# Patient Record
Sex: FEMALE | Race: WHITE | NOT HISPANIC OR LATINO | Employment: OTHER | URBAN - METROPOLITAN AREA
[De-identification: names, ages, dates, MRNs, and addresses within clinical notes are randomized per-mention and may not be internally consistent; named-entity substitution may affect disease eponyms.]

---

## 2017-08-29 ENCOUNTER — HOSPITAL ENCOUNTER (OUTPATIENT)
Dept: GENERAL RADIOLOGY | Facility: HOSPITAL | Age: 67
Discharge: HOME | End: 2017-08-29
Admitting: ORTHOPAEDIC SURGERY

## 2017-08-29 ENCOUNTER — APPOINTMENT (OUTPATIENT)
Dept: PREADMISSION TESTING | Facility: HOSPITAL | Age: 67
End: 2017-08-29

## 2017-08-29 VITALS — BODY MASS INDEX: 28.14 KG/M2 | WEIGHT: 149.03 LBS | HEIGHT: 61 IN

## 2017-08-29 DIAGNOSIS — Z01.89 LABORATORY TEST: Primary | ICD-10-CM

## 2017-08-29 LAB
ANION GAP SERPL CALCULATED.3IONS-SCNC: 4 MMOL/L (ref 3–11)
BASOPHILS # BLD AUTO: 0.06 10*3/MM3 (ref 0–0.2)
BASOPHILS NFR BLD AUTO: 0.6 % (ref 0–1)
BUN BLD-MCNC: 9 MG/DL (ref 9–23)
BUN/CREAT SERPL: 11.3 (ref 7–25)
CALCIUM SPEC-SCNC: 9.9 MG/DL (ref 8.7–10.4)
CHLORIDE SERPL-SCNC: 106 MMOL/L (ref 99–109)
CO2 SERPL-SCNC: 31 MMOL/L (ref 20–31)
CREAT BLD-MCNC: 0.8 MG/DL (ref 0.6–1.3)
DEPRECATED RDW RBC AUTO: 46.6 FL (ref 37–54)
EOSINOPHIL # BLD AUTO: 0.14 10*3/MM3 (ref 0–0.3)
EOSINOPHIL NFR BLD AUTO: 1.5 % (ref 0–3)
ERYTHROCYTE [DISTWIDTH] IN BLOOD BY AUTOMATED COUNT: 13.9 % (ref 11.3–14.5)
GFR SERPL CREATININE-BSD FRML MDRD: 72 ML/MIN/1.73
GLUCOSE BLD-MCNC: 127 MG/DL (ref 70–100)
HBA1C MFR BLD: 5.7 % (ref 4.8–5.6)
HCT VFR BLD AUTO: 43.5 % (ref 34.5–44)
HGB BLD-MCNC: 14.5 G/DL (ref 11.5–15.5)
IMM GRANULOCYTES # BLD: 0.02 10*3/MM3 (ref 0–0.03)
IMM GRANULOCYTES NFR BLD: 0.2 % (ref 0–0.6)
LYMPHOCYTES # BLD AUTO: 1.78 10*3/MM3 (ref 0.6–4.8)
LYMPHOCYTES NFR BLD AUTO: 19.3 % (ref 24–44)
MCH RBC QN AUTO: 30.5 PG (ref 27–31)
MCHC RBC AUTO-ENTMCNC: 33.3 G/DL (ref 32–36)
MCV RBC AUTO: 91.4 FL (ref 80–99)
MONOCYTES # BLD AUTO: 0.48 10*3/MM3 (ref 0–1)
MONOCYTES NFR BLD AUTO: 5.2 % (ref 0–12)
NEUTROPHILS # BLD AUTO: 6.76 10*3/MM3 (ref 1.5–8.3)
NEUTROPHILS NFR BLD AUTO: 73.2 % (ref 41–71)
PLATELET # BLD AUTO: 273 10*3/MM3 (ref 150–450)
PMV BLD AUTO: 11.2 FL (ref 6–12)
POTASSIUM BLD-SCNC: 4.2 MMOL/L (ref 3.5–5.5)
RBC # BLD AUTO: 4.76 10*6/MM3 (ref 3.89–5.14)
SODIUM BLD-SCNC: 141 MMOL/L (ref 132–146)
WBC NRBC COR # BLD: 9.24 10*3/MM3 (ref 3.5–10.8)

## 2017-08-29 PROCEDURE — 71020 HC CHEST PA AND LATERAL: CPT

## 2017-08-29 PROCEDURE — 85025 COMPLETE CBC W/AUTO DIFF WBC: CPT | Performed by: ORTHOPAEDIC SURGERY

## 2017-08-29 PROCEDURE — 86901 BLOOD TYPING SEROLOGIC RH(D): CPT

## 2017-08-29 PROCEDURE — 36415 COLL VENOUS BLD VENIPUNCTURE: CPT

## 2017-08-29 PROCEDURE — 93010 ELECTROCARDIOGRAM REPORT: CPT | Performed by: INTERNAL MEDICINE

## 2017-08-29 PROCEDURE — 93005 ELECTROCARDIOGRAM TRACING: CPT

## 2017-08-29 PROCEDURE — 80048 BASIC METABOLIC PNL TOTAL CA: CPT | Performed by: ORTHOPAEDIC SURGERY

## 2017-08-29 PROCEDURE — 86900 BLOOD TYPING SEROLOGIC ABO: CPT

## 2017-08-29 PROCEDURE — 83036 HEMOGLOBIN GLYCOSYLATED A1C: CPT | Performed by: ORTHOPAEDIC SURGERY

## 2017-08-29 RX ORDER — CALCIUM CARBONATE 750 MG/1
750 TABLET, CHEWABLE ORAL AS NEEDED
COMMUNITY

## 2017-08-29 RX ORDER — NAPROXEN SODIUM 220 MG
220 TABLET ORAL 2 TIMES DAILY PRN
COMMUNITY
End: 2017-09-09 | Stop reason: HOSPADM

## 2017-08-29 RX ORDER — HYDROCODONE BITARTRATE AND ACETAMINOPHEN 10; 325 MG/1; MG/1
1 TABLET ORAL EVERY 6 HOURS PRN
COMMUNITY
End: 2017-09-09 | Stop reason: HOSPADM

## 2017-08-30 LAB
ABO GROUP BLD: NORMAL
RH BLD: POSITIVE

## 2017-09-07 ENCOUNTER — APPOINTMENT (OUTPATIENT)
Dept: GENERAL RADIOLOGY | Facility: HOSPITAL | Age: 67
End: 2017-09-07

## 2017-09-07 ENCOUNTER — HOSPITAL ENCOUNTER (INPATIENT)
Facility: HOSPITAL | Age: 67
LOS: 2 days | Discharge: HOME HEALTH | End: 2017-09-09
Attending: ORTHOPAEDIC SURGERY | Admitting: ORTHOPAEDIC SURGERY

## 2017-09-07 ENCOUNTER — ANESTHESIA (OUTPATIENT)
Dept: PERIOP | Facility: HOSPITAL | Age: 67
End: 2017-09-07

## 2017-09-07 ENCOUNTER — ANESTHESIA EVENT (OUTPATIENT)
Dept: PERIOP | Facility: HOSPITAL | Age: 67
End: 2017-09-07

## 2017-09-07 DIAGNOSIS — Z96.652 STATUS POST TOTAL LEFT KNEE REPLACEMENT: ICD-10-CM

## 2017-09-07 DIAGNOSIS — Z78.9 IMPAIRED MOBILITY AND ADLS: ICD-10-CM

## 2017-09-07 DIAGNOSIS — Z74.09 IMPAIRED MOBILITY AND ADLS: ICD-10-CM

## 2017-09-07 DIAGNOSIS — Z74.09 IMPAIRED FUNCTIONAL MOBILITY, BALANCE, GAIT, AND ENDURANCE: Primary | ICD-10-CM

## 2017-09-07 PROBLEM — M17.12 OSTEOARTHRITIS OF LEFT KNEE: Status: ACTIVE | Noted: 2017-09-07

## 2017-09-07 PROBLEM — K21.9 GERD (GASTROESOPHAGEAL REFLUX DISEASE): Status: ACTIVE | Noted: 2017-09-07

## 2017-09-07 PROBLEM — R73.03 PREDIABETES: Status: ACTIVE | Noted: 2017-09-07

## 2017-09-07 LAB
GLUCOSE BLDC GLUCOMTR-MCNC: 106 MG/DL (ref 70–130)
GLUCOSE BLDC GLUCOMTR-MCNC: 71 MG/DL (ref 70–130)
GLUCOSE BLDC GLUCOMTR-MCNC: 81 MG/DL (ref 70–130)

## 2017-09-07 PROCEDURE — 82962 GLUCOSE BLOOD TEST: CPT

## 2017-09-07 PROCEDURE — 25010000002 ROPIVACAINE PER 1 MG: Performed by: NURSE ANESTHETIST, CERTIFIED REGISTERED

## 2017-09-07 PROCEDURE — 25010000002 MORPHINE PER 10 MG: Performed by: ORTHOPAEDIC SURGERY

## 2017-09-07 PROCEDURE — 97162 PT EVAL MOD COMPLEX 30 MIN: CPT

## 2017-09-07 PROCEDURE — C1776 JOINT DEVICE (IMPLANTABLE): HCPCS | Performed by: ORTHOPAEDIC SURGERY

## 2017-09-07 PROCEDURE — 97116 GAIT TRAINING THERAPY: CPT

## 2017-09-07 PROCEDURE — C1713 ANCHOR/SCREW BN/BN,TIS/BN: HCPCS | Performed by: ORTHOPAEDIC SURGERY

## 2017-09-07 PROCEDURE — C1755 CATHETER, INTRASPINAL: HCPCS | Performed by: ORTHOPAEDIC SURGERY

## 2017-09-07 PROCEDURE — 25010000002 PHENYLEPHRINE PER 1 ML: Performed by: NURSE ANESTHETIST, CERTIFIED REGISTERED

## 2017-09-07 PROCEDURE — 25010000002 PROPOFOL 10 MG/ML EMULSION: Performed by: NURSE ANESTHETIST, CERTIFIED REGISTERED

## 2017-09-07 PROCEDURE — 25010000002 HYDROMORPHONE PER 4 MG: Performed by: ORTHOPAEDIC SURGERY

## 2017-09-07 PROCEDURE — G0108 DIAB MANAGE TRN  PER INDIV: HCPCS

## 2017-09-07 PROCEDURE — 94799 UNLISTED PULMONARY SVC/PX: CPT

## 2017-09-07 PROCEDURE — 25010000002 ROPIVACAINE PER 1 MG: Performed by: ORTHOPAEDIC SURGERY

## 2017-09-07 PROCEDURE — 73560 X-RAY EXAM OF KNEE 1 OR 2: CPT

## 2017-09-07 PROCEDURE — 25010000002 ONDANSETRON PER 1 MG: Performed by: ORTHOPAEDIC SURGERY

## 2017-09-07 PROCEDURE — 0SRD0J9 REPLACEMENT OF LEFT KNEE JOINT WITH SYNTHETIC SUBSTITUTE, CEMENTED, OPEN APPROACH: ICD-10-PCS | Performed by: ORTHOPAEDIC SURGERY

## 2017-09-07 DEVICE — ATTUNE PATELLA MEDIALIZED ANATOMIC 32MM CEMENTED AOX
Type: IMPLANTABLE DEVICE | Site: KNEE | Status: FUNCTIONAL
Brand: ATTUNE

## 2017-09-07 DEVICE — TOTL KN ATTUNE DEPUY 9527038: Type: IMPLANTABLE DEVICE | Site: KNEE | Status: FUNCTIONAL

## 2017-09-07 DEVICE — ATTUNE KNEE SYSTEM FEMORAL POSTERIOR STABILIZED NARROW SIZE 5N LEFT CEMENTED
Type: IMPLANTABLE DEVICE | Site: KNEE | Status: FUNCTIONAL
Brand: ATTUNE

## 2017-09-07 DEVICE — ATTUNE KNEE SYSTEM TIBIAL INSERT FIXED BEARING POSTERIOR STABILIZED 5 6MM AOX
Type: IMPLANTABLE DEVICE | Site: KNEE | Status: FUNCTIONAL
Brand: ATTUNE

## 2017-09-07 DEVICE — SMARTSET HIGH PERFORMANCE MV MEDIUM VISCOSITY BONE CEMENT 40G
Type: IMPLANTABLE DEVICE | Status: FUNCTIONAL
Brand: SMARTSET

## 2017-09-07 DEVICE — ATTUNE KNEE SYSTEM TIBIAL BASE FIXED BEARING SIZE 3 CEMENTED
Type: IMPLANTABLE DEVICE | Site: KNEE | Status: FUNCTIONAL
Brand: ATTUNE

## 2017-09-07 RX ORDER — DOCUSATE SODIUM 100 MG/1
100 CAPSULE, LIQUID FILLED ORAL 2 TIMES DAILY PRN
Status: DISCONTINUED | OUTPATIENT
Start: 2017-09-07 | End: 2017-09-09 | Stop reason: HOSPADM

## 2017-09-07 RX ORDER — SODIUM CHLORIDE 0.9 % (FLUSH) 0.9 %
1-10 SYRINGE (ML) INJECTION AS NEEDED
Status: DISCONTINUED | OUTPATIENT
Start: 2017-09-07 | End: 2017-09-07 | Stop reason: HOSPADM

## 2017-09-07 RX ORDER — HYDRALAZINE HYDROCHLORIDE 20 MG/ML
10 INJECTION INTRAMUSCULAR; INTRAVENOUS EVERY 6 HOURS PRN
Status: DISCONTINUED | OUTPATIENT
Start: 2017-09-07 | End: 2017-09-09 | Stop reason: HOSPADM

## 2017-09-07 RX ORDER — CLINDAMYCIN PHOSPHATE 900 MG/50ML
900 INJECTION INTRAVENOUS EVERY 8 HOURS
Status: COMPLETED | OUTPATIENT
Start: 2017-09-07 | End: 2017-09-08

## 2017-09-07 RX ORDER — ASPIRIN 325 MG
325 TABLET, DELAYED RELEASE (ENTERIC COATED) ORAL DAILY
Status: DISCONTINUED | OUTPATIENT
Start: 2017-09-08 | End: 2017-09-09 | Stop reason: HOSPADM

## 2017-09-07 RX ORDER — NICOTINE POLACRILEX 4 MG
15 LOZENGE BUCCAL
Status: DISCONTINUED | OUTPATIENT
Start: 2017-09-07 | End: 2017-09-09 | Stop reason: HOSPADM

## 2017-09-07 RX ORDER — MORPHINE SULFATE 1 MG/ML
INJECTION, SOLUTION EPIDURAL; INTRATHECAL; INTRAVENOUS AS NEEDED
Status: DISCONTINUED | OUTPATIENT
Start: 2017-09-07 | End: 2017-09-07 | Stop reason: HOSPADM

## 2017-09-07 RX ORDER — SENNA AND DOCUSATE SODIUM 50; 8.6 MG/1; MG/1
2 TABLET, FILM COATED ORAL 2 TIMES DAILY
Status: DISCONTINUED | OUTPATIENT
Start: 2017-09-07 | End: 2017-09-09 | Stop reason: HOSPADM

## 2017-09-07 RX ORDER — FAMOTIDINE 20 MG/1
20 TABLET, FILM COATED ORAL ONCE
Status: COMPLETED | OUTPATIENT
Start: 2017-09-07 | End: 2017-09-07

## 2017-09-07 RX ORDER — DEXTROSE MONOHYDRATE 25 G/50ML
25 INJECTION, SOLUTION INTRAVENOUS
Status: DISCONTINUED | OUTPATIENT
Start: 2017-09-07 | End: 2017-09-09 | Stop reason: HOSPADM

## 2017-09-07 RX ORDER — ROPIVACAINE HYDROCHLORIDE 2 MG/ML
12 INJECTION, SOLUTION EPIDURAL; INFILTRATION CONTINUOUS
Status: DISCONTINUED | OUTPATIENT
Start: 2017-09-07 | End: 2017-09-09 | Stop reason: HOSPADM

## 2017-09-07 RX ORDER — ONDANSETRON 2 MG/ML
4 INJECTION INTRAMUSCULAR; INTRAVENOUS EVERY 6 HOURS PRN
Status: DISCONTINUED | OUTPATIENT
Start: 2017-09-07 | End: 2017-09-09 | Stop reason: HOSPADM

## 2017-09-07 RX ORDER — HYDROMORPHONE HYDROCHLORIDE 1 MG/ML
0.5 INJECTION, SOLUTION INTRAMUSCULAR; INTRAVENOUS; SUBCUTANEOUS
Status: CANCELLED | OUTPATIENT
Start: 2017-09-07

## 2017-09-07 RX ORDER — SODIUM CHLORIDE, SODIUM LACTATE, POTASSIUM CHLORIDE, CALCIUM CHLORIDE 600; 310; 30; 20 MG/100ML; MG/100ML; MG/100ML; MG/100ML
9 INJECTION, SOLUTION INTRAVENOUS CONTINUOUS
Status: DISCONTINUED | OUTPATIENT
Start: 2017-09-07 | End: 2017-09-09 | Stop reason: HOSPADM

## 2017-09-07 RX ORDER — CLINDAMYCIN PHOSPHATE 900 MG/50ML
900 INJECTION, SOLUTION INTRAVENOUS ONCE
Status: COMPLETED | OUTPATIENT
Start: 2017-09-07 | End: 2017-09-07

## 2017-09-07 RX ORDER — FAMOTIDINE 10 MG/ML
20 INJECTION, SOLUTION INTRAVENOUS ONCE
Status: CANCELLED | OUTPATIENT
Start: 2017-09-07 | End: 2017-09-07

## 2017-09-07 RX ORDER — HYDROMORPHONE HYDROCHLORIDE 1 MG/ML
0.5 INJECTION, SOLUTION INTRAMUSCULAR; INTRAVENOUS; SUBCUTANEOUS
Status: DISCONTINUED | OUTPATIENT
Start: 2017-09-07 | End: 2017-09-09 | Stop reason: HOSPADM

## 2017-09-07 RX ORDER — ROPIVACAINE HYDROCHLORIDE 5 MG/ML
INJECTION, SOLUTION EPIDURAL; INFILTRATION; PERINEURAL AS NEEDED
Status: DISCONTINUED | OUTPATIENT
Start: 2017-09-07 | End: 2017-09-07 | Stop reason: HOSPADM

## 2017-09-07 RX ORDER — BUPIVACAINE HYDROCHLORIDE 2.5 MG/ML
INJECTION, SOLUTION EPIDURAL; INFILTRATION; INTRACAUDAL AS NEEDED
Status: DISCONTINUED | OUTPATIENT
Start: 2017-09-07 | End: 2017-09-07 | Stop reason: SURG

## 2017-09-07 RX ORDER — ACETAMINOPHEN 325 MG/1
650 TABLET ORAL EVERY 4 HOURS PRN
Status: DISCONTINUED | OUTPATIENT
Start: 2017-09-07 | End: 2017-09-09 | Stop reason: HOSPADM

## 2017-09-07 RX ORDER — BISACODYL 5 MG/1
10 TABLET, DELAYED RELEASE ORAL DAILY PRN
Status: DISCONTINUED | OUTPATIENT
Start: 2017-09-07 | End: 2017-09-09 | Stop reason: HOSPADM

## 2017-09-07 RX ORDER — HYDROCODONE BITARTRATE AND ACETAMINOPHEN 7.5; 325 MG/1; MG/1
2 TABLET ORAL EVERY 4 HOURS PRN
Status: DISCONTINUED | OUTPATIENT
Start: 2017-09-07 | End: 2017-09-09 | Stop reason: HOSPADM

## 2017-09-07 RX ORDER — DIPHENHYDRAMINE HCL 25 MG
25 CAPSULE ORAL EVERY 6 HOURS PRN
Status: DISCONTINUED | OUTPATIENT
Start: 2017-09-07 | End: 2017-09-09 | Stop reason: HOSPADM

## 2017-09-07 RX ORDER — CLINDAMYCIN PHOSPHATE 900 MG/50ML
900 INJECTION, SOLUTION INTRAVENOUS EVERY 8 HOURS
Status: DISCONTINUED | OUTPATIENT
Start: 2017-09-07 | End: 2017-09-07 | Stop reason: SDUPTHER

## 2017-09-07 RX ORDER — HYDROCODONE BITARTRATE AND ACETAMINOPHEN 7.5; 325 MG/1; MG/1
1 TABLET ORAL EVERY 4 HOURS PRN
Status: DISCONTINUED | OUTPATIENT
Start: 2017-09-07 | End: 2017-09-09 | Stop reason: HOSPADM

## 2017-09-07 RX ORDER — MELOXICAM 7.5 MG/1
15 TABLET ORAL DAILY
Status: DISCONTINUED | OUTPATIENT
Start: 2017-09-07 | End: 2017-09-09 | Stop reason: HOSPADM

## 2017-09-07 RX ORDER — MAGNESIUM HYDROXIDE 1200 MG/15ML
LIQUID ORAL AS NEEDED
Status: DISCONTINUED | OUTPATIENT
Start: 2017-09-07 | End: 2017-09-07 | Stop reason: HOSPADM

## 2017-09-07 RX ORDER — SODIUM CHLORIDE 9 MG/ML
100 INJECTION, SOLUTION INTRAVENOUS CONTINUOUS
Status: DISCONTINUED | OUTPATIENT
Start: 2017-09-07 | End: 2017-09-09 | Stop reason: HOSPADM

## 2017-09-07 RX ORDER — CALCIUM CARBONATE 200(500)MG
1.5 TABLET,CHEWABLE ORAL AS NEEDED
Status: DISCONTINUED | OUTPATIENT
Start: 2017-09-07 | End: 2017-09-09 | Stop reason: HOSPADM

## 2017-09-07 RX ORDER — PROPOFOL 10 MG/ML
VIAL (ML) INTRAVENOUS CONTINUOUS PRN
Status: DISCONTINUED | OUTPATIENT
Start: 2017-09-07 | End: 2017-09-07 | Stop reason: SURG

## 2017-09-07 RX ORDER — BISACODYL 10 MG
10 SUPPOSITORY, RECTAL RECTAL DAILY PRN
Status: DISCONTINUED | OUTPATIENT
Start: 2017-09-07 | End: 2017-09-09 | Stop reason: HOSPADM

## 2017-09-07 RX ORDER — ONDANSETRON 4 MG/1
4 TABLET, FILM COATED ORAL EVERY 6 HOURS PRN
Status: DISCONTINUED | OUTPATIENT
Start: 2017-09-07 | End: 2017-09-09 | Stop reason: HOSPADM

## 2017-09-07 RX ORDER — LIDOCAINE HYDROCHLORIDE 10 MG/ML
0.5 INJECTION, SOLUTION EPIDURAL; INFILTRATION; INTRACAUDAL; PERINEURAL ONCE AS NEEDED
Status: COMPLETED | OUTPATIENT
Start: 2017-09-07 | End: 2017-09-07

## 2017-09-07 RX ORDER — FENTANYL CITRATE 50 UG/ML
50 INJECTION, SOLUTION INTRAMUSCULAR; INTRAVENOUS
Status: DISCONTINUED | OUTPATIENT
Start: 2017-09-07 | End: 2017-09-07 | Stop reason: HOSPADM

## 2017-09-07 RX ORDER — NALOXONE HCL 0.4 MG/ML
0.1 VIAL (ML) INJECTION
Status: DISCONTINUED | OUTPATIENT
Start: 2017-09-07 | End: 2017-09-09 | Stop reason: HOSPADM

## 2017-09-07 RX ORDER — BUPIVACAINE HYDROCHLORIDE 5 MG/ML
INJECTION, SOLUTION EPIDURAL; INTRACAUDAL AS NEEDED
Status: DISCONTINUED | OUTPATIENT
Start: 2017-09-07 | End: 2017-09-07 | Stop reason: SURG

## 2017-09-07 RX ORDER — DIPHENHYDRAMINE HYDROCHLORIDE 50 MG/ML
25 INJECTION INTRAMUSCULAR; INTRAVENOUS EVERY 6 HOURS PRN
Status: DISCONTINUED | OUTPATIENT
Start: 2017-09-07 | End: 2017-09-09 | Stop reason: HOSPADM

## 2017-09-07 RX ADMIN — Medication 2 TABLET: at 18:13

## 2017-09-07 RX ADMIN — HYDROCODONE BITARTRATE AND ACETAMINOPHEN 1 TABLET: 7.5; 325 TABLET ORAL at 15:02

## 2017-09-07 RX ADMIN — PHENYLEPHRINE HYDROCHLORIDE 100 MCG: 10 INJECTION INTRAVENOUS at 11:24

## 2017-09-07 RX ADMIN — PROPOFOL 50 MCG/KG/MIN: 10 INJECTION, EMULSION INTRAVENOUS at 10:20

## 2017-09-07 RX ADMIN — HYDROMORPHONE HYDROCHLORIDE 0.5 MG: 1 INJECTION, SOLUTION INTRAMUSCULAR; INTRAVENOUS; SUBCUTANEOUS at 16:47

## 2017-09-07 RX ADMIN — CLINDAMYCIN PHOSPHATE 900 MG: 900 INJECTION, SOLUTION INTRAVENOUS at 10:12

## 2017-09-07 RX ADMIN — FAMOTIDINE 20 MG: 20 TABLET ORAL at 08:20

## 2017-09-07 RX ADMIN — BUPIVACAINE HYDROCHLORIDE 10 MG: 5 INJECTION, SOLUTION EPIDURAL; INTRACAUDAL; PERINEURAL at 10:17

## 2017-09-07 RX ADMIN — SODIUM CHLORIDE, POTASSIUM CHLORIDE, SODIUM LACTATE AND CALCIUM CHLORIDE 9 ML/HR: 600; 310; 30; 20 INJECTION, SOLUTION INTRAVENOUS at 08:10

## 2017-09-07 RX ADMIN — HYDROCODONE BITARTRATE AND ACETAMINOPHEN 1 TABLET: 7.5; 325 TABLET ORAL at 19:12

## 2017-09-07 RX ADMIN — PHENYLEPHRINE HYDROCHLORIDE 100 MCG: 10 INJECTION INTRAVENOUS at 11:12

## 2017-09-07 RX ADMIN — ONDANSETRON 4 MG: 2 INJECTION INTRAMUSCULAR; INTRAVENOUS at 16:09

## 2017-09-07 RX ADMIN — SODIUM CHLORIDE 680 MG: 9 INJECTION, SOLUTION INTRAVENOUS at 11:18

## 2017-09-07 RX ADMIN — TRANEXAMIC ACID 680 MG: 100 INJECTION, SOLUTION INTRAVENOUS at 10:26

## 2017-09-07 RX ADMIN — MELOXICAM 15 MG: 7.5 TABLET ORAL at 15:02

## 2017-09-07 RX ADMIN — LIDOCAINE HYDROCHLORIDE 0.2 ML: 10 INJECTION, SOLUTION EPIDURAL; INFILTRATION; INTRACAUDAL; PERINEURAL at 08:10

## 2017-09-07 RX ADMIN — PROPOFOL 50 MCG/KG/MIN: 10 INJECTION, EMULSION INTRAVENOUS at 10:17

## 2017-09-07 RX ADMIN — ROPIVACAINE HYDROCHLORIDE 12 ML/HR: 2 INJECTION, SOLUTION EPIDURAL; INFILTRATION at 12:30

## 2017-09-07 RX ADMIN — CLINDAMYCIN PHOSPHATE 900 MG: 18 INJECTION, SOLUTION INTRAVENOUS at 18:20

## 2017-09-07 RX ADMIN — BUPIVACAINE HYDROCHLORIDE 20 ML: 2.5 INJECTION, SOLUTION EPIDURAL; INFILTRATION; INTRACAUDAL; PERINEURAL at 11:53

## 2017-09-07 RX ADMIN — SODIUM CHLORIDE 100 ML/HR: 9 INJECTION, SOLUTION INTRAVENOUS at 13:48

## 2017-09-07 NOTE — H&P
Patient Name: Qi Gupta  MRN: [id number]  : 1950  DOS: 2017    Attending: Jacques Younger, *    Primary Care Provider: Marylu Kimbrough MD      Chief complaint:  Left knee pain    Subjective   Patient is a 66 y.o. female presented for left total knee arthroplasty by Dr. Younger under spinal anesthesia. She tolerated surgery well and is admitted for further medical management. Her knee has been painful for several years. She uses a can occasionally when the pain is severe.     When seen in PACU she is doing well. She denies pain, but is still under effects of spinal anesthesia. She denies nausea, shortness of breath or chest pain. No hx of DVT or PE.    ( When seen later in her room, she continues to do well.  Effects of spinal anesthesia have subsided.  She has some mild pain and she has received Lortab.  No nausea or vomiting, no fever or chills, no shortness of breath or chest pain.  She is awaiting ambulating with PT.)wy     Allergies:  Allergies   Allergen Reactions   • Ceftin [Cefuroxime Axetil] Hives and Itching       Meds:  Prescriptions Prior to Admission   Medication Sig Dispense Refill Last Dose   • calcium carbonate EX (TUMS EX) 750 MG chewable tablet Chew 750 mg As Needed for Indigestion or Heartburn.   Past Week at Unknown time   • naproxen sodium (ALEVE) 220 MG tablet Take 220 mg by mouth 2 (Two) Times a Day As Needed for Mild Pain .   Past Month at Unknown time   • psyllium (METAMUCIL) 58.6 % packet Take 1 packet by mouth Daily.   2017 at 0830   • HYDROcodone-acetaminophen (NORCO)  MG per tablet Take 1 tablet by mouth Every 6 (Six) Hours As Needed for Moderate Pain .   2017       History:   Past Medical History:   Diagnosis Date   • Acid reflux    • Arthritis    • Diverticulosis    • Hypertension     Borderline   • Knee pain, left    • Mitral valve prolapse    • Wears glasses      Past Surgical History:   Procedure Laterality Date   • BIOPSY SKIN / SQ / MUCOUS  MEMBRANE      MOLE- UPPER RIGHT LEG   • COLONOSCOPY     • LAPAROSCOPIC TUBAL LIGATION       History reviewed. No pertinent family history.  Social History   Substance Use Topics   • Smoking status: Former Smoker     Packs/day: 0.25     Years: 30.00     Types: Cigarettes     Quit date: 8/29/1999   • Smokeless tobacco: Never Used   • Alcohol use No   She is  with 2 children. She is retired from factory.    Review of Systems  Pertinent items are noted in HPI, all other systems reviewed and negative    Vital Signs  /79 (BP Location: Right arm)  Pulse 85  Temp 97.6 °F (36.4 °C) (Oral)   Resp 16  SpO2 98%    Physical Exam:    General Appearance:    Alert, cooperative, in no acute distress   Head:    Normocephalic, without obvious abnormality, atraumatic   Eyes:            Lids and lashes normal, conjunctivae and sclerae normal, no   icterus, no pallor, corneas clear, PERRLA   Ears:    Ears appear intact with no abnormalities noted   Neck:   No adenopathy, supple, trachea midline, no thyromegaly, no     carotid bruit, no JVD   Lungs:     Clear to auscultation,respirations regular, even and                   unlabored    Heart:    Regular rhythm and normal rate, normal S1 and S2, no            murmur, no gallop    Abdomen:     Normal bowel sounds, no masses, no organomegaly, soft        non-tender, non-distended, no guarding, no rebound                 tenderness   Genitalia:    Deferred   Extremities:   Left knee ACE wrap CDI. Nerve block present   Pulses:   Pulses palpable and equal bilaterally   Skin:   No bleeding, bruising or rash   Neurologic:   Cranial nerves 2 - 12 grossly intact, sensation intact and movement limited due to spinal block effects  Exam later: Flexion and dorsiflexion intact bilateral feet.  Normal sensation distal LEs.wy        I reviewed the patient's new clinical results.     Results for MARANDA JIMENEZ (MRN [id number]) as of 9/7/2017 12:23   Ref. Range 8/29/2017 12:55   Glucose  Latest Ref Range: 70 - 100 mg/dL 127 (H)   Sodium Latest Ref Range: 132 - 146 mmol/L 141   Potassium Latest Ref Range: 3.5 - 5.5 mmol/L 4.2   CO2 Latest Ref Range: 20.0 - 31.0 mmol/L 31.0   Chloride Latest Ref Range: 99 - 109 mmol/L 106   Anion Gap Latest Ref Range: 3.0 - 11.0 mmol/L 4.0   Creatinine Latest Ref Range: 0.60 - 1.30 mg/dL 0.80   BUN Latest Ref Range: 9 - 23 mg/dL 9   BUN/Creatinine Ratio Latest Ref Range: 7.0 - 25.0  11.3   Calcium Latest Ref Range: 8.7 - 10.4 mg/dL 9.9   eGFR Non African Amer Latest Ref Range: >60 mL/min/1.73 72   Hemoglobin A1C Latest Ref Range: 4.80 - 5.60 % 5.70 (H)   WBC Latest Ref Range: 3.50 - 10.80 10*3/mm3 9.24   RBC Latest Ref Range: 3.89 - 5.14 10*6/mm3 4.76   Hemoglobin Latest Ref Range: 11.5 - 15.5 g/dL 14.5   Hematocrit Latest Ref Range: 34.5 - 44.0 % 43.5   RDW Latest Ref Range: 11.3 - 14.5 % 13.9   MCV Latest Ref Range: 80.0 - 99.0 fL 91.4   MCH Latest Ref Range: 27.0 - 31.0 pg 30.5   MCHC Latest Ref Range: 32.0 - 36.0 g/dL 33.3   MPV Latest Ref Range: 6.0 - 12.0 fL 11.2   Platelets Latest Ref Range: 150 - 450 10*3/mm3 273     Assessment and Plan:   Principal Problem:    Status post total left knee replacement  Active Problems:    Osteoarthritis of left knee    Prediabetes    GERD (gastroesophageal reflux disease)      Plan  1. PT/OT- early ambulation post op  2. Pain control-prns, AC nerve block  3. IS-encourage  4. DVT proph- mechs/ASA  5. Bowel regimen  6. Resume home medications as appropriate  7. Monitor post-op labs, BG  8. DC planning for home with HHPT    GERD  - Continue home Tums  PreDM  - hgb A1c on 8/29/17 5.7  - Accuchecks ACHS with low dose SSI  - DM educator consult    Seen and examined by me. Agree with above, plan formulated with APRN . Discussed with patient and family.     Madiha Broussard MD  09/07/17  3:53 PM

## 2017-09-07 NOTE — PLAN OF CARE
Problem: Inpatient Physical Therapy  Goal: Stair Training Goal LTG- PT  Outcome: Ongoing (interventions implemented as appropriate)    09/07/17 1535   Stair Training PT LTG   Stair Training Goal PT LTG, Date Established 09/07/17   Stair Training Goal PT LTG, Time to Achieve 5 days   Stair Training Goal PT LTG, Number of Steps 1   Stair Training Goal PT LTG, [place] Level conditional independence   Stair Training Goal PT LTG, Assist Device walker, rolling;other (see comments)  (backwards)   Stair Training Goal PT LTG, Date Goal Reviewed 09/07/17   Stair Training Goal PT LTG, Outcome goal ongoing

## 2017-09-07 NOTE — ANESTHESIA PROCEDURE NOTES
Spinal Block    Patient location during procedure: OR  Start Time: 9/7/2017 10:13 AM  Stop Time: 9/7/2017 10:17 AM  Indication:at surgeon's request  Performed By  CRNA: WILLIE ESCOBEDO  Preanesthetic Checklist  Completed: patient identified, site marked, surgical consent, pre-op evaluation, timeout performed, IV checked, risks and benefits discussed and monitors and equipment checked  Spinal Block Prep:  Patient Position:sitting  Sterile Tech:cap, gloves, sterile barriers and mask  Prep:Chloraprep  Patient Monitoring:blood pressure monitoring, continuous pulse oximetry and EKG  Spinal Block Procedure  Approach:midline  Guidance:landmark technique and palpation technique  Location:L4-L5  Needle Type:Jacek  Needle Gauge:25 G  Placement of Spinal needle event:cerebrospinal fluid aspirated    Fluid Appearance:clear  Post Assessment  Patient Tolerance:patient tolerated the procedure well with no apparent complications  Complications no  Additional Notes  Procedure:  Pt assisted to sitting position, with legs in position of comfort over side of bed.  Pt. instructed in optimal spine presentation, the spine was prepped/ Draped and the skin at insertion site was anesthetized with 1% Lidocaine 2 ml.  The spinal needle was then advanced until CSF flow was obtained and LA was injected:      Marcaine 0.5% PSF injected 10   Mg.

## 2017-09-07 NOTE — ANESTHESIA PROCEDURE NOTES
Adductor Canal Cath    Patient location during procedure: post-op  Start time: 9/7/2017 11:45 AM  Stop time: 9/7/2017 11:53 AM  Reason for block: at surgeon's request and post-op pain management  Performed by  Anesthesiologist: COLTON RAINEY  Assisted by: WILLIE ESCOBEDO  Preanesthetic Checklist  Completed: patient identified, site marked, surgical consent, pre-op evaluation, timeout performed, IV checked, risks and benefits discussed and monitors and equipment checked  Prep:  Sterile barriers:cap, gloves, mask and sterile barriers  Prep: ChloraPrep  Patient monitoring: continuous pulse oximetry and EKG  Procedure  Sedation:no  Performed under: spinal  Guidance:ultrasound guided  Images:still images obtained    Laterality:left  Block Type:adductor canal block  Injection Technique:catheter  Needle Type:Tuohy and echogenic  Needle Gauge:18 G    Catheter Size:20 G (20g)  Cath Depth at skin: 9 cm  Medications  Local Injected:bupivacaine 0.25% Local Amount Injected:20 (ml)mL  Post Assessment  Injection Assessment: negative aspiration for heme, incremental injection and no paresthesia on injection  Patient Tolerance:comfortable throughout block  Complications:no  Additional Notes  Procedure:             The pt was placed in the Supine position.  The Insertion site was  prepped and Draped in sterile fashion.  The pt was anesthetized with  IV Sedation( see meds).  Skin and cutaneous tissue was infiltrated and anesthetized with 1% Lidocaine 3 mls via a 25g needle.  A BBraun 4 inch 18g echogenic needle was then  inserted approximately midline, mid-thigh and advanced In-plane with Ultrasound guidance.  Normal Saline PSF was utilized for hydrodissection of tissue.  The Vastus medialis and Sartorius muscle where visualized and the needle tip was placed in the adductor canal,  lateral to the femoral artery.  LA injection spread was visualized, injection was incremental 1-5ml, injection pressure was normal or little, no  intraneural injection, no vascular injection.  LA dose was injected thru the needle(see dose above).  A BBraun 20g wire stylet catheter was placed via the needle with ultrasound visualization and confirmation with NS fluid bolus. The labeled Catheter was then secured to skin at insertion site with skin afix and steristrips to curled catheter and CHG transparent dressing.  Thank you.

## 2017-09-07 NOTE — CONSULTS
Diabetes Education  Assessment/Teaching    Patient Name:  Qi Gupta  YOB: 1950  MRN: [id number]  Admit Date:  9/7/2017      Assessment Date:  9/7/2017       Most Recent Value    General Information      Referral From:  A1c, Blood glucose, MD order    Pregnancy Assessment     Diabetes History     What type of diabetes do you have?  Pre-diabetes    Length of Diabetes Diagnosis  Newly diagnosed <6 months    Current DM knowledge  poor    Have you had diabetes education/teaching in the past?  no    Do you test your blood sugar at home?  no    Education Preferences     What areas of diabetes would you like to learn about?  avoiding high blood sugar, diabetes complications, diet information, exercise information, understanding diabetes, testing my blood sugar at home, stress/coping skills, resources on diabetes    Nutrition Information     Assessment Topics     Healthy Eating - Assessment  Needs education    Being Active - Assessment  Needs education    Problem Solving - Assessment  Needs education    Reducing Risk - Assessment  Needs education    Healthy Coping - Assessment  Needs education    Monitoring - Assessment  Needs education    DM Goals     Healthy Eating - Goal  0-7 days from discharge    Being Active - Goal  0-30 days from discharge    Problem Solving - Goal  0-7 days from discharge    Reducing Risk - Goal  0-7 days from discharge    Healthy Coping - Goal  0-7 days from discharge    Monitoring - Goal  0-7 days from discharge               Most Recent Value    DM Education Needs     Meter  Meter provided    Meter Type  One Touch    Frequency of Testing  3 times a week    Blood Glucose Target Range  <100    Reducing Risks  A1C testing, Blood pressure, Eye exam, Immunizations, Cardiovascular    Healthy Eating  Reviewed meal plan    Physical Activity  Walking    Physical Activity Frequency  Rarely    Healthy Coping  Appropriate    Motivation  Engaged    Teaching Method  Explanation, Discussion,  Handouts, Teach back    Patient Response  Verbalized understanding            Other Comments:    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 Pt educated with family present in room. Patient educated on Pre-diabetes, diabetes and the disease process, types of DM, diagnosis/A1C, monitoring, signs and symptoms, activity and exercise and how to prevent disease from progressing. Changing behavior and goal setting relative to diet, exercise and healthy lifestyle was emphasized. Patient provided a new donated meter and knows how to use it. Pt. was also advised to contact PCP for prescription for lancets and strips. Pt. verbalized understanding and advised to consult with PCP for further instructions, discuss A1C result, and POC. Education handouts provided, questions answered and pt. advised to call with any other questions or concerns.        Electronically signed by:  Juvencio Villarreal RN  09/07/17 4:37 PM

## 2017-09-07 NOTE — BRIEF OP NOTE
TOTAL KNEE ARTHROPLASTY  Procedure Note    Qi Gupta  9/7/2017    Pre-op Diagnosis: Left knee osteoarthritis    Post-op Diagnosis:     Post-Op Diagnosis Codes:     * Osteoarthritis of left knee [M17.9]    Procedure/CPT® Codes:  [place] TOTAL KNEE ARTHROPLASTY [25894]    Procedure(s):  TOTAL KNEE ARTHROPLASTY LEFT     Surgeon(s):  Jacques Younger MD     Assistant: BHARAT Stevenson    Anesthesia: Spinal, local and adductor canal catheter    Staff:   Circulator: Halie Welch RN; Nico Mendez RN  Scrub Person: Evy Schmidt  Nursing Assistant: Tyler Bojorquez  Assistant: BHARAT Suh  [place]: Mihaela Knowles    Estimated Blood Loss: *No blood loss documented*  Urine Voided: * No values recorded between 9/7/2017 10:11 AM and 9/7/2017 11:59 AM *    Specimens:                * No specimens in log *      Drains: None          Findings: Severe tricompartmental left knee degenerative changes    Complications: None    Tourniquet time: 70 minutes at 300 mmHg    Implants: Depuy Attune PS TKA size 5N femur, 3 tibia, 6 poly and 32 patella      Jacques Younger MD     Date: 9/7/2017  Time: 12:14 PM

## 2017-09-07 NOTE — INTERVAL H&P NOTE
H&P reviewed. The patient was examined and there are no changes to the H&P.     Temp:  [98 °F (36.7 °C)] 98 °F (36.7 °C)  Heart Rate:  [62] 62  BP: (162)/(84) 162/84     Heart: RRR  Lungs: CTAB    Immunizations:    Tetanus: No     Influenza:No     Pneumo:No    Labs were reviewed.       Results for MARANDA JIMENEZ (MRN [id number]) as of 9/7/2017 08:51   Ref. Range 8/29/2017 12:55   Glucose Latest Ref Range: 70 - 100 mg/dL 127 (H)   Sodium Latest Ref Range: 132 - 146 mmol/L 141   Potassium Latest Ref Range: 3.5 - 5.5 mmol/L 4.2   CO2 Latest Ref Range: 20.0 - 31.0 mmol/L 31.0   Chloride Latest Ref Range: 99 - 109 mmol/L 106   Anion Gap Latest Ref Range: 3.0 - 11.0 mmol/L 4.0   Creatinine Latest Ref Range: 0.60 - 1.30 mg/dL 0.80   BUN Latest Ref Range: 9 - 23 mg/dL 9   BUN/Creatinine Ratio Latest Ref Range: 7.0 - 25.0  11.3   Calcium Latest Ref Range: 8.7 - 10.4 mg/dL 9.9   eGFR Non African Amer Latest Ref Range: >60 mL/min/1.73 72   Hemoglobin A1C Latest Ref Range: 4.80 - 5.60 % 5.70 (H)   WBC Latest Ref Range: 3.50 - 10.80 10*3/mm3 9.24   RBC Latest Ref Range: 3.89 - 5.14 10*6/mm3 4.76   Hemoglobin Latest Ref Range: 11.5 - 15.5 g/dL 14.5   Hematocrit Latest Ref Range: 34.5 - 44.0 % 43.5   RDW Latest Ref Range: 11.3 - 14.5 % 13.9   MCV Latest Ref Range: 80.0 - 99.0 fL 91.4   MCH Latest Ref Range: 27.0 - 31.0 pg 30.5   MCHC Latest Ref Range: 32.0 - 36.0 g/dL 33.3   MPV Latest Ref Range: 6.0 - 12.0 fL 11.2   Platelets Latest Ref Range: 150 - 450 10*3/mm3 273   RDW-SD Latest Ref Range: 37.0 - 54.0 fl 46.6   Neutrophil % Latest Ref Range: 41.0 - 71.0 % 73.2 (H)   Lymphocyte % Latest Ref Range: 24.0 - 44.0 % 19.3 (L)   Monocyte % Latest Ref Range: 0.0 - 12.0 % 5.2   Eosinophil % Latest Ref Range: 0.0 - 3.0 % 1.5   Basophil % Latest Ref Range: 0.0 - 1.0 % 0.6   Immature Grans % Latest Ref Range: 0.0 - 0.6 % 0.2   Neutrophils, Absolute Latest Ref Range: 1.50 - 8.30 10*3/mm3 6.76   Lymphocytes, Absolute Latest Ref Range: 0.60  - 4.80 10*3/mm3 1.78   Monocytes, Absolute Latest Ref Range: 0.00 - 1.00 10*3/mm3 0.48   Eosinophils, Absolute Latest Ref Range: 0.00 - 0.30 10*3/mm3 0.14   Basophils, Absolute Latest Ref Range: 0.00 - 0.20 10*3/mm3 0.06   Immature Grans, Absolute Latest Ref Range: 0.00 - 0.03 10*3/mm3 0.02     EKG: NSR    Plan: left TKA

## 2017-09-07 NOTE — ANESTHESIA PREPROCEDURE EVALUATION
Anesthesia Evaluation     Patient summary reviewed and Nursing notes reviewed   no history of anesthetic complications:  NPO Solid Status: > 8 hours  NPO Liquid Status: > 8 hours     Airway   Mallampati: II  TM distance: >3 FB  Neck ROM: full  no difficulty expected  Dental - normal exam     Pulmonary - normal exam   (+) a smoker Former,   Cardiovascular   Exercise tolerance: good (4-7 METS)    ECG reviewed  Rhythm: regular  Rate: normal    (+) hypertension, valvular problems/murmurs MVP,   (-) angina, NELSON      Neuro/Psych- negative ROS  GI/Hepatic/Renal/Endo    (+)  GERD well controlled,   (-) hepatitis, liver disease, no renal disease, diabetes    Musculoskeletal     (+) arthralgias,   Abdominal    Substance History      OB/GYN          Other   (+) arthritis                                     Anesthesia Plan    ASA 2     spinal, regional and MAC   (Labs/studies reviewed  Post op ACB)  intravenous induction   Anesthetic plan and risks discussed with patient.  Use of blood products discussed with consented to blood products.   Plan discussed with CRNA.

## 2017-09-07 NOTE — OP NOTE
Preoperative diagnosis: Left knee end stage osteoarthritis    Postoperative diagnosis: Left knee end stage osteoarthritis    Operative procedure: Left total knee arthroplasty    Surgeon: Dr. Isak Younger    Assistant: BHARAT Stevenson.  Necessary during the case for positioning of the patient, exposure, implantation of components and closure.    Anesthesia: Spinal with local and adductor canal catheter    Estimated blood loss: Minimal    Implants: Depuy Attune  posterior stabilized total knee arthroplasty size 5 narrow femur, 3 tibia, 32 patella, 5 poly    Tourniquet time: 70 minutes at 300 mmHg    Indications for procedure: Qi is a very pleasant 66-year-old female who has struggled with end-stage activity limiting right knee pain secondary to osteoarthritis.  X-rays this year revealed severe tricompartmental degenerative changes in a varus knee with complete loss of medial joint space and marginal osteophyte formation.  She has failed exhaustive conservative treatment measures including cortisone injections, viscosupplementation injections in June and physical therapy.  After undergoing a shared decision-making process they agreed to proceed with left total knee arthroplasty.  Surgical consent form was signed.    Description of procedure: The patient was seen in the preoperative holding area and the left leg was signed to confirm the correct operative site.  They were seen by anesthesia.  They received Clindamycin 900mg IV prophylactic antibiotics within 1 hour of incision time.  They were brought back to the operating room.  Spinal was performed without difficulty and was given sedation throughout the case.  Patient placed in the supine position and all of  bony prominences were well-padded.  A bump was placed underneath the left hip.  Nonsterile tourniquet was applied to the thigh.  The left lower extremity was prepped and draped in the usual sterile fashion and timeout was performed to confirm left total knee  arthroplasty for patient Qi Gupta.    The left lower extremity was exsanguinated with an Esmarch.  Tourniquet was inflated to 300 mmHg.  With the knee flexed a midline incision was made with a 10 blade scalpel.  Adequate hemostasis maintained with Bovie electrocautery.  Full-thickness medial and lateral flaps were elevated.  Using a fresh 10 blade scalpel I made a medial parapatellar arthrotomy.  The patella was everted.  Patella fat pad and anterior femoral fat pads were excised.  Marginal osteophytes were removed.  Medial release was performed for this varus knee using Bovie electrocautery.  Kaufman's line was marked.  Z retractors were placed medially and laterally.  The distal femur was then drilled and intramedullary distal femoral cutting guide was pinned in place set at a 5° valgus cut for a 9 mm cut.  This cut was then made with the oscillating saw.  The femur was then sized to a size 5 set at 3° of external rotation.  4-in-1 cutting guide was pinned in place.  Anterior and posterior cuts were made as were the chamfer cuts.  Cut bone was removed.  We then turned our attention to the tibia.    The tibia was subluxed anteriorly. PCL retractor was placed as were medial and lateral Hohmann retractors.  We then used the extramedullary tibial cutting guide set at 3° posterior slope for the proximal tibial cut.  5 mm of bone was removed from the low medial side and a centimeter from the high lateral side.  Medial and lateral menisci were then excised as were posterior osteophytes.  Flexion and extension gaps were then checked and with a 6 mm block we achieved full extension and flexion with excellent alignment. The tibia was sized to a 3 tibial tray centered off the medial third of the tibial tubercle.  The tray was pinned in place.  We then reamed the tibia and impacted the tibial fins which were left in place for trialing.  Size 5 cutting guide was then used to make the box femoral cut with the reciprocal  saw.  The trial femur was then placed and held in place with pins. We trialed with a size 6 poly, 5 femur and 3 tibia.  With these trial components in place we achieved full extension and flexion with excellent alignment and stable throughout.  Lug holes for the femur were drilled.    We then turned our attention to the patella.  Patella was sized to 22 mm in thickness so I made a 9-1/2 mm patellar cut with the reciprocal saw.  A 32 trial was placed and the patella drill holes were made.  With the trial button in place there was excellent tracking with the no thumbs technique.    Trial components were then removed.  Final components were opened on the back table.  Posterior capsule was injected with 20 cc of half percent Ropivicaine and 5 mg of morphine.  Cement was mixed on the back table.  The knee was copiously irrigated with Pulsavac lavage.  The knee was thoroughly dried and a bone plug was placed in the distal femoral drill hole.  Cement was then applied to the tibia and final size 3 tibial tray was impacted in place and excess cement was removed.  Cement was applied to the femur and final size 5 narrow femur was impacted in place and excess cement removed.  We then placed a 6 mm poly-this was seen to be fully seated in the tibial tray.  Knee was then placed in extension and we applied cement to the cut patellar surface and 32 patella was held in place with patellar clamp.  All excess cement was removed.  The knee was left in extension while cement cured.    Once the cement was fully cured we irrigated the knee with Pulsavac lavage.  The knee was taken through full range of motion and seen to achieve full extension and flexion with excellent patellar tracking.    We then proceeded with closure.  The deep fascia was closed with a #1 Stratafix barbed suture.  Dermis was closed with 2-0 Vicryl.  Skin was closed with a running 3-0 Stratafix barbed subcuticular suture.  A sterile dressing was then applied with  Pirineo mesh dressing and Dermabond.  We then applied 4 x 4's, ABDs, soft roll and a six-inch Ace bandage.    Tourniquet was deflated at 70 minutes.  Patient was transferred to recovery in stable condition.  All sponge and needle counts were correct ×2.  Patient received first dose of TXA just prior to incision and the second dose 5-10 minutes prior to letting down the tourniquet to minimize bleeding.    Postoperative plan:  Patient will be admitted to Dr. SAM for medical management  Mobilize starting today with PT/OT as tolerated  Start Aspirin for DVT prophylaxis tomorrow   consult for home physical therapy and home equipment needs  Follow-up with me in 2-3 weeks.    Jacques Younger MD  09/07/17  12:15 PM

## 2017-09-07 NOTE — ANESTHESIA POSTPROCEDURE EVALUATION
Patient: Qi Gupta    Procedure Summary     Date Anesthesia Start Anesthesia Stop Room / Location    09/07/17 1011 1158 [facility] / [facility]       Procedure Diagnosis Surgeon Provider    TOTAL KNEE ARTHROPLASTY LEFT  (Left Knee) No diagnosis on file. MD Harper Adkins MD          Anesthesia Type: spinal, regional, MAC  Last vitals  /68       Temp     98.0   Pulse    76   Resp     16   SpO2     95     Post Anesthesia Care and Evaluation    Patient location during evaluation: PACU  Patient participation: complete - patient participated  Level of consciousness: awake and alert  Pain score: 0  Pain management: adequate  Airway patency: patent  Anesthetic complications: No anesthetic complications  PONV Status: none  Cardiovascular status: hemodynamically stable and acceptable  Respiratory status: nonlabored ventilation, acceptable and nasal cannula  Hydration status: acceptable

## 2017-09-07 NOTE — THERAPY EVALUATION
Acute Care - Physical Therapy Initial Evaluation  [facility]     Patient Name: Qi Gupta  : 1950  MRN: [id number]  Today's Date: 2017   Onset of Illness/Injury or Date of Surgery Date: 17  Date of Referral to PT: 17  Referring Physician: MD Aren      Admit Date: 2017     Visit Dx:    ICD-10-CM ICD-9-CM   1. Impaired functional mobility, balance, gait, and endurance Z74.09 V49.89     Patient Active Problem List   Diagnosis   • Osteoarthritis of left knee   • Status post total left knee replacement   • Prediabetes   • GERD (gastroesophageal reflux disease)     Past Medical History:   Diagnosis Date   • Acid reflux    • Arthritis    • Diverticulosis    • Hypertension     Borderline   • Knee pain, left    • Mitral valve prolapse    • Wears glasses      Past Surgical History:   Procedure Laterality Date   • BIOPSY SKIN / SQ / MUCOUS MEMBRANE      MOLE- UPPER RIGHT LEG   • COLONOSCOPY     • LAPAROSCOPIC TUBAL LIGATION            PT ASSESSMENT (last 72 hours)      PT Evaluation       17 1535 17 0812    Rehab Evaluation    Document Type evaluation  -LR     Subjective Information agree to therapy;complains of;pain;numbness  -LR     Patient Effort, Rehab Treatment good  -LR     Symptoms Noted During/After Treatment increased pain;other (see comments)   nausea and vomiting.   -LR     Symptoms Noted Comment Notified RN.   -LR     General Information    Patient Profile Review yes  -LR     Onset of Illness/Injury or Date of Surgery Date 17  -LR     Referring Physician MD Aren  -LR     General Observations Patient supine in bed upon arrival with  and family present. IV, L adductor canal nerve catheter, L knee ace bandaged, SCDs.   -LR     Pertinent History Of Current Problem Patient presents for surgical management of persistent and progressive L knee pain and dysfunction that has failed to improve with conservative management.   -LR     Precautions/Limitations fall  precautions;other (see comments)   L adductor canal nerve catheter  -LR     Prior Level of Function min assist:;all household mobility;gait;transfer;bed mobility;dependent:;community mobility;home management;cooking;cleaning;shopping;driving   all mobility limited by pain;no driving,no home management.   -LR     Equipment Currently Used at Home cane, straight;cane, quad;grab bar;bath bench   used SPC occasionally  -LR cane, straight;other (see comments)  -NM    Plans/Goals Discussed With patient;spouse/S.O.;family;agreed upon  -LR     Risks Reviewed patient:;spouse/S.O.:;family:;LOB;nausea/vomiting;dizziness;increased discomfort  -LR     Benefits Reviewed patient:;spouse/S.O.:;family:;improve function;increase independence;increase strength;increase balance;decrease pain;increase knowledge  -LR     Barriers to Rehab previous functional deficit  -LR     Living Environment    Lives With spouse  -LR spouse  -NM    Living Arrangements house  -LR house  -NM    Home Accessibility bed and bath on same level;grab bars present (toilet);house is not wheelchair accessible;stairs to enter home;stairs within home;tub/shower is not walk in   will be staying on first floor  -LR     Number of Stairs to Enter Home --   2 steps onto porch, then one into home, no handrails  -LR     Number of Stairs Within Home 1   into sunroom  -LR     Stair Railings at Home none  -LR     Type of Financial/Environmental Concern none  -LR     Transportation Available family or friend will provide  -LR family or friend will provide  -NM    Living Environment Comment Patient reports her  will be available to assist at all times.   -LR     Clinical Impression    Date of Referral to PT 09/07/17  -LR     PT Diagnosis s/p elective L TKA  -LR     Prognosis good  -LR     Patient/Family Goals Statement go home, decrease pain  -LR     Criteria for Skilled Therapeutic Interventions Met yes;treatment indicated  -LR     Rehab Potential good, to achieve  stated therapy goals  -LR     Pain Assessment    Pain Assessment 0-10  -LR     Pain Score 7  -LR     Post Pain Score 10  -LR     Pain Type Acute pain  -LR     Pain Location Knee  -LR     Pain Orientation Left;Anterior;Outer  -LR     Pain Intervention(s) Repositioned;Ambulation/increased activity;Medication (See MAR)  -LR     Vision Assessment/Intervention    Visual Impairment WFL  -LR     Cognitive Assessment/Intervention    Current Cognitive/Communication Assessment functional  -LR     Orientation Status oriented x 4;required verbal cueing (specifiy in comments)  -LR     Follows Commands/Answers Questions 100% of the time;able to follow single-step instructions;needs cueing  -LR     Personal Safety WNL/WFL  -LR     ROM (Range of Motion)    General ROM lower extremity range of motion deficits identified  -LR     General LE Assessment    ROM RLE ROM was WFL;knee, left: LE ROM deficit  -LR     ROM Detail L knee AROM impaired 25%  -LR     MMT (Manual Muscle Testing)    General MMT Assessment lower extremity strength deficits identified  -LR     Lower Extremity    Lower Ext Manual Muscle Testing right LE strength is WFL;left knee strength deficit  -LR     Lower Ext Manual Muscle Testing Detail L knee functionally 4-/5  -LR     Mobility Assessment/Training    Extremity Weight-Bearing Status left lower extremity  -LR     Left Lower Extremity Weight-Bearing weight-bearing as tolerated  -LR     Bed Mobility, Assessment/Treatment    Bed Mobility, Assistive Device head of bed elevated;bed rails  -LR     Bed Mob, Supine to Sit, [place] verbal cues required;minimum assist (75% patient effort)  -LR     Bed Mob, Sit to Supine, [place] not tested   UI at end of eval.   -LR     Bed Mobility, Safety Issues decreased use of legs for bridging/pushing  -LR     Bed Mobility, Impairments ROM decreased;strength decreased;pain  -LR     Bed Mobility, Comment Verbal cues to move LEs towards EOB and to push up from bed to raise  trunk into sitting and to scoot hips out to get feet on floor. Min assist required to move L LE. Pt c/o nausea upon sitting up and proceeded to vomit in basin. Notified RN.   -LR     Transfer Assessment/Treatment    Transfers, Sit-Stand [place] verbal cues required;contact guard assist;2 person assist required  -LR     Transfers, Stand-Sit [place] verbal cues required;contact guard assist;2 person assist required  -LR     Transfers, Sit-Stand-Sit, Assist Device rolling walker  -LR     Transfer, Safety Issues sequencing ability decreased;step length decreased;weight-shifting ability decreased  -LR     Transfer, Impairments ROM decreased;strength decreased;pain  -LR     Transfer, Comment Verbal cues to push up from bed to stand and to reach back for chair to lower into sitting. Verbal cues to step L LE out before t/f.   -LR     Gait Assessment/Treatment    Gait, [place] Level verbal cues required;contact guard assist;2 person assist required  -LR     Gait, Assistive Device rolling walker  -LR     Gait, Distance (Feet) 150  -LR     Gait, Gait Pattern Analysis swing-through gait  -LR     Gait, Gait Deviations left:;antalgic;decreased heel strike;forward flexed posture;step length decreased;toe-to-floor clearance decreased;weight-shifting ability decreased  -LR     Gait, Safety Issues sequencing ability decreased;step length decreased;weight-shifting ability decreased  -LR     Gait, Impairments ROM decreased;strength decreased;pain  -LR     Gait, Comment Patient initiated gait with step to gait pattern at slow pace. Verbal cues for increased L LE weight bearing/stance phase and decreased UE weight bearing. Improved with cues for correction, able to progress to step through gait pattern. Gait limited by pain/fatigue.   -LR     Therapy Exercises    Exercise Protocols total knee  -LR     Total Knee Exercises left:;10 reps;ankle pumps/circles;quad set;glut set   cues for technique  -LR     Sensory  Assessment/Intervention    Sensory Impairment --   c/o mild numbness; able to actively DF bilaterally  -LR     Positioning and Restraints    Pre-Treatment Position in bed  -LR     Post Treatment Position chair  -LR     In Chair notified nsg;reclined;sitting;call light within reach;encouraged to call for assist;exit alarm on;legs elevated;compression device;with family/caregiver  -LR       User Key  (r) = Recorded By, (t) = Taken By, (c) = Cosigned By    Initials Name Provider Type    LR Yenny Lujan, PT Physical Therapist    BRITTNEY Fair RN Registered Nurse          Physical Therapy Education     Title: PT OT SLP Therapies (Done)     Topic: Physical Therapy (Done)     Point: Mobility training (Done)    Learning Progress Summary    Learner Readiness Method Response Comment Documented by Status   Patient Acceptance D,E VU,NR Educated on weight bearing status and correct gait mechanics.  09/07/17 1728 Done               Point: Home exercise program (Done)    Learning Progress Summary    Learner Readiness Method Response Comment Documented by Status   Patient Acceptance D,E VU,NR Educated on weight bearing status and correct gait mechanics.  09/07/17 1728 Done               Point: Body mechanics (Done)    Learning Progress Summary    Learner Readiness Method Response Comment Documented by Status   Patient Acceptance D,E VU,NR Educated on weight bearing status and correct gait mechanics. LR 09/07/17 1728 Done               Point: Precautions (Done)    Learning Progress Summary    Learner Readiness Method Response Comment Documented by Status   Patient Acceptance D,E VU,NR Educated on weight bearing status and correct gait mechanics.  09/07/17 1728 Done                      User Key     Initials Effective Dates Name Provider Type Discipline     06/19/15 -  Yenny Lujan, PT Physical Therapist PT                PT Recommendation and Plan  Anticipated Equipment Needs At Discharge: front wheeled  walker  Anticipated Discharge Disposition: home with assist, home with home health  Planned Therapy Interventions: balance training, bed mobility training, gait training, home exercise program, patient/family education, ROM (Range of Motion), stair training, strengthening, transfer training  PT Frequency: 2 times/day  Plan of Care Review  Plan Of Care Reviewed With: patient, spouse, family  Progress: progress toward functional goals as expected  Outcome Summary/Follow up Plan: Patient ambulated 150 feet with RW, limited by pain and nausea/vomiting. ROM to be initiated POD#1. Plan is d/c home with HHPT.           IP PT Goals       09/07/17 1635 09/07/17 1535       Bed Mobility PT LTG    Bed Mobility PT LTG, Date Established  09/07/17  -LR     Bed Mobility PT LTG, Time to Achieve  5 days  -LR     Bed Mobility PT LTG, Activity Type  supine to sit/sit to supine  -LR     Bed Mobility PT LTG, [place] Level  conditional independence  -LR     Bed Mobility PT LTG, Date Goal Reviewed  09/07/17  -LR     Bed Mobility PT LTG, Outcome  goal ongoing  -LR     Transfer Training PT LTG    Transfer Training PT LTG, Date Established  09/07/17  -LR     Transfer Training PT LTG, Time to Achieve  5 days  -LR     Transfer Training PT LTG, Activity Type  sit to stand/stand to sit  -LR     Transfer Training PT LTG, [place] Level  conditional independence  -LR     Transfer Training PT LTG, Assist Device  walker, rolling  -LR     Transfer Training PT  LTG, Date Goal Reviewed  09/07/17  -LR     Transfer Training PT LTG, Outcome  goal ongoing  -LR     Gait Training PT LTG    Gait Training Goal PT LTG, Date Established  09/07/17  -LR     Gait Training Goal PT LTG, Time to Achieve  5 days  -LR     Gait Training Goal PT LTG, [place] Level  conditional independence  -LR     Gait Training Goal PT LTG, Assist Device  walker, rolling  -LR     Gait Training Goal PT LTG, Distance to Achieve  500 feet  -LR     Gait Training Goal PT LTG,  Date Goal Reviewed  09/07/17  -LR     Gait Training Goal PT LTG, Outcome  goal ongoing  -LR     Stair Training PT LTG    Stair Training Goal PT LTG, Date Established 09/07/17  -LR 09/07/17  -LR     Stair Training Goal PT LTG, Time to Achieve 5 days  -LR 5 days  -LR     Stair Training Goal PT LTG, Number of Steps 2  -LR 1  -LR     Stair Training Goal PT LTG, [place] Level contact guard assist  -LR conditional independence  -LR     Stair Training Goal PT LTG, Assist Device cane, straight;other (see comments)   HHA  -LR walker, rolling;other (see comments)   backwards  -LR     Stair Training Goal PT LTG, Date Goal Reviewed 09/07/17  -LR 09/07/17  -LR     Stair Training Goal PT LTG, Outcome goal ongoing  -LR goal ongoing  -LR     Range of Motion PT LTG    Range of Motion Goal PT LTG, Date Established  09/07/17  -LR     Range of Motion Goal PT LTG, Time to Achieve  5 days  -LR     Range fo Motion Goal PT LTG, Joint  L knee  -LR     Range of Motion Goal PT LTG, AAROM Measure  0-90 degrees  -LR     Range of Motion Goal PT LTG, Date Goal Reviewed  09/07/17  -LR     Range of Motion Goal PT LTG, Outcome  goal ongoing  -LR       User Key  (r) = Recorded By, (t) = Taken By, (c) = Cosigned By    Initials Name Provider Type    LR Yenny Lujan, PT Physical Therapist                Outcome Measures       09/07/17 1535          How much help from another person do you currently need...    Turning from your back to your side while in flat bed without using bedrails? 3  -LR      Moving from lying on back to sitting on the side of a flat bed without bedrails? 3  -LR      Moving to and from a bed to a chair (including a wheelchair)? 3  -LR      Standing up from a chair using your arms (e.g., wheelchair, bedside chair)? 3  -LR      Climbing 3-5 steps with a railing? 2  -LR      To walk in hospital room? 3  -LR      AM-PAC 6 Clicks Score 17  -LR      Functional Assessment    Outcome Measure Options AM-PAC 6 Clicks Basic  Mobility (PT)  -LR        User Key  (r) = Recorded By, (t) = Taken By, (c) = Cosigned By    Initials Name Provider Type    LR Yenny Lujan PT Physical Therapist           Time Calculation:         PT Charges       09/07/17 1732          Time Calculation    Start Time 1535  -LR      PT Received On 09/07/17  -LR      PT Goal Re-Cert Due Date 09/17/17  -LR      Time Calculation- PT    Total Timed Code Minutes- PT 15 minute(s)  -LR        User Key  (r) = Recorded By, (t) = Taken By, (c) = Cosigned By    Initials Name Provider Type    LR Yenny Lujan, PT Physical Therapist          Therapy Charges for Today     Code Description Service Date Service Provider Modifiers Qty    17251699034 HC GAIT TRAINING EA 15 MIN 9/7/2017 Yenny Lujan, PT GP 1    02947669333 HC PT THER SUPP EA 15 MIN 9/7/2017 Yenny Lujan, PT GP 3    00539413737 HC PT EVAL MOD COMPLEXITY 3 9/7/2017 Yenny Lujan, PT GP 1          PT G-Codes  Outcome Measure Options: AM-PAC 6 Clicks Basic Mobility (PT)      Yenny Lujan, PT  9/7/2017

## 2017-09-07 NOTE — PLAN OF CARE
Problem: Patient Care Overview (Adult)  Goal: Plan of Care Review  Outcome: Ongoing (interventions implemented as appropriate)    09/07/17 1535   Coping/Psychosocial Response Interventions   Plan Of Care Reviewed With patient;spouse;family   Patient Care Overview   Progress progress toward functional goals as expected   Outcome Evaluation   Outcome Summary/Follow up Plan Patient ambulated 150 feet with RW, limited by pain and nausea/vomiting. ROM to be initiated POD#1. Plan is d/c home with HHPT.          Problem: Knee Replacement, Total (Adult)  Goal: Signs and Symptoms of Listed Potential Problems Will be Absent or Manageable (Knee Replacement, Total)  Outcome: Ongoing (interventions implemented as appropriate)    09/07/17 1535   Knee Replacement, Total   Problems Assessed (Total Knee Replacement) pain;decreased range of motion;functional decline/self care deficit   Problems Present (Total Knee Replacement) pain;decreased range of motion;functional decline/self care deficit         Problem: Inpatient Physical Therapy  Goal: Bed Mobility Goal LTG- PT  Outcome: Ongoing (interventions implemented as appropriate)    09/07/17 1535   Bed Mobility PT LTG   Bed Mobility PT LTG, Date Established 09/07/17   Bed Mobility PT LTG, Time to Achieve 5 days   Bed Mobility PT LTG, Activity Type supine to sit/sit to supine   Bed Mobility PT LTG, [place] Level conditional independence   Bed Mobility PT LTG, Date Goal Reviewed 09/07/17   Bed Mobility PT LTG, Outcome goal ongoing       Goal: Transfer Training Goal 1 LTG- PT  Outcome: Ongoing (interventions implemented as appropriate)    09/07/17 1535   Transfer Training PT LTG   Transfer Training PT LTG, Date Established 09/07/17   Transfer Training PT LTG, Time to Achieve 5 days   Transfer Training PT LTG, Activity Type sit to stand/stand to sit   Transfer Training PT LTG, [place] Level conditional independence   Transfer Training PT LTG, Assist Device walker, rolling    Transfer Training PT LTG, Date Goal Reviewed 09/07/17   Transfer Training PT LTG, Outcome goal ongoing       Goal: Gait Training Goal LTG- PT  Outcome: Ongoing (interventions implemented as appropriate)    09/07/17 1535   Gait Training PT LTG   Gait Training Goal PT LTG, Date Established 09/07/17   Gait Training Goal PT LTG, Time to Achieve 5 days   Gait Training Goal PT LTG, [place] Level conditional independence   Gait Training Goal PT LTG, Assist Device walker, rolling   Gait Training Goal PT LTG, Distance to Achieve 500 feet   Gait Training Goal PT LTG, Date Goal Reviewed 09/07/17   Gait Training Goal PT LTG, Outcome goal ongoing       Goal: Stair Training Goal LTG- PT  Outcome: Ongoing (interventions implemented as appropriate)    09/07/17 1635   Stair Training PT LTG   Stair Training Goal PT LTG, Date Established 09/07/17   Stair Training Goal PT LTG, Time to Achieve 5 days   Stair Training Goal PT LTG, Number of Steps 2   Stair Training Goal PT LTG, [place] Level contact guard assist   Stair Training Goal PT LTG, Assist Device cane, straight;other (see comments)  (HHA)   Stair Training Goal PT LTG, Date Goal Reviewed 09/07/17   Stair Training Goal PT LTG, Outcome goal ongoing       Goal: Range of Motion Goal LTG- PT  Outcome: Ongoing (interventions implemented as appropriate)    09/07/17 1535   Range of Motion PT LTG   Range of Motion Goal PT LTG, Date Established 09/07/17   Range of Motion Goal PT LTG, Time to Achieve 5 days   Range fo Motion Goal PT LTG, Joint L knee   Range of Motion Goal PT LTG, AAROM Measure 0-90 degrees   Range of Motion Goal PT LTG, Date Goal Reviewed 09/07/17   Range of Motion Goal PT LTG, Outcome goal ongoing

## 2017-09-08 LAB
ANION GAP SERPL CALCULATED.3IONS-SCNC: 6 MMOL/L (ref 3–11)
BASOPHILS # BLD AUTO: 0.03 10*3/MM3 (ref 0–0.2)
BASOPHILS NFR BLD AUTO: 0.3 % (ref 0–1)
BUN BLD-MCNC: 7 MG/DL (ref 9–23)
BUN/CREAT SERPL: 8.8 (ref 7–25)
CALCIUM SPEC-SCNC: 8.7 MG/DL (ref 8.7–10.4)
CHLORIDE SERPL-SCNC: 104 MMOL/L (ref 99–109)
CO2 SERPL-SCNC: 27 MMOL/L (ref 20–31)
CREAT BLD-MCNC: 0.8 MG/DL (ref 0.6–1.3)
DEPRECATED RDW RBC AUTO: 46.1 FL (ref 37–54)
EOSINOPHIL # BLD AUTO: 0.07 10*3/MM3 (ref 0–0.3)
EOSINOPHIL NFR BLD AUTO: 0.7 % (ref 0–3)
ERYTHROCYTE [DISTWIDTH] IN BLOOD BY AUTOMATED COUNT: 13.8 % (ref 11.3–14.5)
GFR SERPL CREATININE-BSD FRML MDRD: 72 ML/MIN/1.73
GLUCOSE BLD-MCNC: 107 MG/DL (ref 70–100)
GLUCOSE BLDC GLUCOMTR-MCNC: 106 MG/DL (ref 70–130)
GLUCOSE BLDC GLUCOMTR-MCNC: 111 MG/DL (ref 70–130)
GLUCOSE BLDC GLUCOMTR-MCNC: 79 MG/DL (ref 70–130)
GLUCOSE BLDC GLUCOMTR-MCNC: 80 MG/DL (ref 70–130)
HCT VFR BLD AUTO: 38.2 % (ref 34.5–44)
HGB BLD-MCNC: 12.9 G/DL (ref 11.5–15.5)
IMM GRANULOCYTES # BLD: 0.02 10*3/MM3 (ref 0–0.03)
IMM GRANULOCYTES NFR BLD: 0.2 % (ref 0–0.6)
LYMPHOCYTES # BLD AUTO: 1.9 10*3/MM3 (ref 0.6–4.8)
LYMPHOCYTES NFR BLD AUTO: 17.9 % (ref 24–44)
MCH RBC QN AUTO: 30.6 PG (ref 27–31)
MCHC RBC AUTO-ENTMCNC: 33.8 G/DL (ref 32–36)
MCV RBC AUTO: 90.5 FL (ref 80–99)
MONOCYTES # BLD AUTO: 1.48 10*3/MM3 (ref 0–1)
MONOCYTES NFR BLD AUTO: 13.9 % (ref 0–12)
NEUTROPHILS # BLD AUTO: 7.11 10*3/MM3 (ref 1.5–8.3)
NEUTROPHILS NFR BLD AUTO: 67 % (ref 41–71)
PLATELET # BLD AUTO: 238 10*3/MM3 (ref 150–450)
PMV BLD AUTO: 11 FL (ref 6–12)
POTASSIUM BLD-SCNC: 3.7 MMOL/L (ref 3.5–5.5)
RBC # BLD AUTO: 4.22 10*6/MM3 (ref 3.89–5.14)
SODIUM BLD-SCNC: 137 MMOL/L (ref 132–146)
WBC NRBC COR # BLD: 10.61 10*3/MM3 (ref 3.5–10.8)

## 2017-09-08 PROCEDURE — 82962 GLUCOSE BLOOD TEST: CPT

## 2017-09-08 PROCEDURE — 80048 BASIC METABOLIC PNL TOTAL CA: CPT | Performed by: NURSE PRACTITIONER

## 2017-09-08 PROCEDURE — 97165 OT EVAL LOW COMPLEX 30 MIN: CPT

## 2017-09-08 PROCEDURE — 25010000002 ROPIVACAINE PER 1 MG: Performed by: NURSE ANESTHETIST, CERTIFIED REGISTERED

## 2017-09-08 PROCEDURE — 97116 GAIT TRAINING THERAPY: CPT

## 2017-09-08 PROCEDURE — 97110 THERAPEUTIC EXERCISES: CPT

## 2017-09-08 PROCEDURE — 85025 COMPLETE CBC W/AUTO DIFF WBC: CPT | Performed by: ORTHOPAEDIC SURGERY

## 2017-09-08 RX ORDER — ROPIVACAINE IN 0.9% SOD CHL/PF 0.2% 545ML
12 ELASTOMERIC PUMP, HI VARIABLE RATE INJECTION CONTINUOUS
Start: 2017-09-09

## 2017-09-08 RX ORDER — HYDROCODONE BITARTRATE AND ACETAMINOPHEN 7.5; 325 MG/1; MG/1
1 TABLET ORAL EVERY 4 HOURS PRN
Qty: 40 TABLET | Refills: 0 | Status: SHIPPED | OUTPATIENT
Start: 2017-09-08 | End: 2017-09-09

## 2017-09-08 RX ORDER — ROPIVACAINE IN 0.9% SOD CHL/PF 0.2% 545ML
12 ELASTOMERIC PUMP, HI VARIABLE RATE INJECTION CONTINUOUS
Status: DISCONTINUED | OUTPATIENT
Start: 2017-09-09 | End: 2017-09-09 | Stop reason: HOSPADM

## 2017-09-08 RX ORDER — PSEUDOEPHEDRINE HCL 30 MG
100 TABLET ORAL 2 TIMES DAILY PRN
Qty: 60 CAPSULE | Refills: 0 | Status: SHIPPED | OUTPATIENT
Start: 2017-09-08

## 2017-09-08 RX ADMIN — PSYLLIUM HUSK 1 PACKET: 3.5 GRANULE ORAL at 09:23

## 2017-09-08 RX ADMIN — HYDROCODONE BITARTRATE AND ACETAMINOPHEN 2 TABLET: 7.5; 325 TABLET ORAL at 15:07

## 2017-09-08 RX ADMIN — HYDROCODONE BITARTRATE AND ACETAMINOPHEN 1 TABLET: 7.5; 325 TABLET ORAL at 00:04

## 2017-09-08 RX ADMIN — CLINDAMYCIN PHOSPHATE 900 MG: 18 INJECTION, SOLUTION INTRAVENOUS at 02:05

## 2017-09-08 RX ADMIN — Medication 2 TABLET: at 17:26

## 2017-09-08 RX ADMIN — HYDROCODONE BITARTRATE AND ACETAMINOPHEN 2 TABLET: 7.5; 325 TABLET ORAL at 09:22

## 2017-09-08 RX ADMIN — MELOXICAM 15 MG: 7.5 TABLET ORAL at 09:23

## 2017-09-08 RX ADMIN — SODIUM CHLORIDE 100 ML/HR: 9 INJECTION, SOLUTION INTRAVENOUS at 02:06

## 2017-09-08 RX ADMIN — ROPIVACAINE HYDROCHLORIDE 12 ML/HR: 2 INJECTION, SOLUTION EPIDURAL; INFILTRATION at 20:58

## 2017-09-08 RX ADMIN — ASPIRIN 325 MG: 325 TABLET, DELAYED RELEASE ORAL at 09:22

## 2017-09-08 RX ADMIN — Medication 2 TABLET: at 09:22

## 2017-09-08 RX ADMIN — HYDROCODONE BITARTRATE AND ACETAMINOPHEN 2 TABLET: 7.5; 325 TABLET ORAL at 20:58

## 2017-09-08 RX ADMIN — ROPIVACAINE HYDROCHLORIDE 12 ML/HR: 2 INJECTION, SOLUTION EPIDURAL; INFILTRATION at 06:00

## 2017-09-08 RX ADMIN — HYDROCODONE BITARTRATE AND ACETAMINOPHEN 1 TABLET: 7.5; 325 TABLET ORAL at 06:00

## 2017-09-08 NOTE — PROGRESS NOTES
Discharge Planning Assessment  [facility]     Patient Name: Qi Gupta  MRN: [id number]  Today's Date: 9/8/2017    Admit Date: 9/7/2017          Discharge Needs Assessment       09/08/17 1054    Living Environment    Lives With spouse    Living Arrangements house    Transportation Available car;family or friend will provide    Living Environment    Provides Primary Care For no one    Quality Of Family Relationships supportive    Able to Return to Prior Living Arrangements yes    Discharge Needs Assessment    Equipment Currently Used at Home cane, quad;bath bench    Equipment Needed After Discharge walker, rolling    Discharge Facility/Level Of Care Needs home with home health            Discharge Plan       09/08/17 1055    Case Management/Social Work Plan    Plan Home    Patient/Family In Agreement With Plan yes    Additional Comments Spoke with patient and  at bedside. They live in one story house in [facility] she was independent with ADL's and used a quad cane to assist with mobility. Per therapy recs patient would benefit from home therapy. Patient is agreeable and per her request a referral has been made to [facility]. She also needs a RW and per her request a referral has been made to [facility] will deliver to patient's room today. CM will notify [facility]  when patient is dc'd. Family available to transport home. Goal is home w/ HH/PT. CM will follow.         Discharge Placement     No information found        Expected Discharge Date and Time     Expected Discharge Date Expected Discharge Time    Sep 11, 2017               Demographic Summary       09/08/17 1053    Referral Information    Arrived From home or self-care    Reason For Consult discharge planning            Functional Status       09/08/17 1054    Functional Status Prior    Ambulation 1-->assistive equipment    Transferring 0-->independent    Toileting 0-->independent    Bathing 0-->independent    Dressing  0-->independent    Eating 0-->independent    Communication 0-->understands/communicates without difficulty    Swallowing 0-->swallows foods/liquids without difficulty            Psychosocial     None            Abuse/Neglect     None            Legal     None            Substance Abuse     None            Patient Forms     None          Cary Bryant, RN

## 2017-09-08 NOTE — PROGRESS NOTES
[facility]    Acute pain service Inpatient Progress Note    Patient Name: Qi Gupta  :  1950  MRN:  [id number]        Acute Pain  Service Inpatient Progress Note:    Analgesia:Good  Pain Score:3/10  LOC: alert and awake  Side Effects:None  Catheter Site:clean and dry  Cath type: Epidural cath(MOOG pump)  Infusion rate: 12ml/hr  Catheter Plan:Catheter to remain Insitu

## 2017-09-08 NOTE — PROGRESS NOTES
IM progress note      Qi JC Gupta  [id number]  1950     LOS: 1 day     Attending: Jacques Younger, *    Primary Care Provider: Marylu Kimbrough MD      Chief Complaint/Reason for visit:  Left knee pain    Subjective   Doing well. Pain control is adequate. Denies f/c/n/v/sob/cp.  Good pain control and good progress. Ambulated 180 ft with CGA with afternoon session. Met stair training goals. wy  Objective     Vital Signs  Blood pressure 127/66, pulse 84, temperature 99.1 °F (37.3 °C), temperature source Temporal Artery , resp. rate 16, SpO2 96 %.  Temp (24hrs), Av.3 °F (37.4 °C), Min:98.3 °F (36.8 °C), Max:100.4 °F (38 °C)      Intake/Output:    Intake/Output Summary (Last 24 hours) at 17 1743  Last data filed at 17 1300   Gross per 24 hour   Intake              720 ml   Output                0 ml   Net              720 ml       Nutrition: PO    Respiratory: RA    Physical Therapy:  Patient ambulated 150 feet with RW, limited by pain and nausea/vomiting. ROM to be initiated POD#1. Plan is d/c home with HHPT.      Physical Exam:     General Appearance:    Alert, cooperative, in no acute distress   Head:    Normocephalic, without obvious abnormality, atraumatic    Lungs:     Normal effort, symmetric chest rise, no crepitus, clear to      auscultation bilaterally             Heart:    Regular rhythm and normal rate, normal S1 and S2   Abdomen:     Normal bowel sounds, no masses, no organomegaly, soft        non-tender, non-distended, no guarding, no rebound                tenderness   Extremities:   No clubbing, cyanosis or edema.  No deformities. CDI dressing/prineo left knee.    Pulses:   Pulses palpable and equal bilaterally   Skin:   No bleeding, bruising or rash   Neurologic:   Moves all extremities with no obvious focal motor deficit.  Cranial nerves 2 - 12 grossly intact     Results Review:     I reviewed the patient's new clinical results.     Results from last 7 days  Lab  Units 09/08/17  0621   WBC 10*3/mm3 10.61   HEMOGLOBIN g/dL 12.9   HEMATOCRIT % 38.2   PLATELETS 10*3/mm3 238       Results from last 7 days  Lab Units 09/08/17  0621   SODIUM mmol/L 137   POTASSIUM mmol/L 3.7   CHLORIDE mmol/L 104   CO2 mmol/L 27.0   BUN mg/dL 7*   CREATININE mg/dL 0.80   CALCIUM mg/dL 8.7   GLUCOSE mg/dL 107*   Results for MARANDA JIMENEZ (MRN [id number]) as of 9/8/2017 13:18   Ref. Range 9/7/2017 20:46 9/8/2017 06:21 9/8/2017 07:07 9/8/2017 11:45   Glucose Latest Ref Range: 70 - 130 mg/dL 106 107 (H) 106 111     I reviewed the patient's new imaging including images and reports.    All medications reviewed.     aspirin 325 mg Oral Daily   insulin lispro 0-7 Units Subcutaneous 4x Daily With Meals & Nightly   meloxicam 15 mg Oral Daily   psyllium 1 packet Oral Daily   sennosides-docusate sodium 2 tablet Oral BID       Assessment/Plan   Principal Problem:    Status post total left knee replacement  Active Problems:    Osteoarthritis of left knee    Prediabetes    GERD (gastroesophageal reflux disease)      Plan  1. PT/OT- WBAT LLE  2. Pain control-prns, AC nerve block   3. IS-encouraged  4. DVT proph- mechs/ASA  5. Bowel regimen  6. Monitor post-op labs, BG  7. DC planning for home with HHPT, likely tomorrow    GERD  - Continue home Tums  PreDM  - hgb A1c on 8/29/17 5.7  - Accuchecks ACHS with low dose SSI  - Seen by DM educator 9/7/17    Seen and examined by me. Agree with above. Discussed with patient.       Madiha Broussard MD  09/08/17  5:43 PM

## 2017-09-08 NOTE — PLAN OF CARE
Problem: Patient Care Overview (Adult)  Goal: Plan of Care Review  Outcome: Ongoing (interventions implemented as appropriate)    09/08/17 1631   Coping/Psychosocial Response Interventions   Plan Of Care Reviewed With patient   Patient Care Overview   Progress improving   Outcome Evaluation   Outcome Summary/Follow up Plan Pt ambulated 180 feet with CGA and RW and progressed to stair training, meeting stair training goals. Pt's ROM measured 10-59 degrees. Pt anticipates discharge home tomorrow, will continue to progress mobility as able         Problem: Knee Replacement, Total (Adult)  Goal: Signs and Symptoms of Listed Potential Problems Will be Absent or Manageable (Knee Replacement, Total)  Outcome: Ongoing (interventions implemented as appropriate)    09/08/17 1631   Knee Replacement, Total   Problems Assessed (Total Knee Replacement) pain;decreased range of motion;functional decline/self care deficit   Problems Present (Total Knee Replacement) pain;decreased range of motion;functional decline/self care deficit         Problem: Inpatient Physical Therapy  Goal: Bed Mobility Goal LTG- PT  Outcome: Ongoing (interventions implemented as appropriate)    09/07/17 1535 09/08/17 1631   Bed Mobility PT LTG   Bed Mobility PT LTG, Date Established 09/07/17 --    Bed Mobility PT LTG, Time to Achieve 5 days --    Bed Mobility PT LTG, Activity Type supine to sit/sit to supine --    Bed Mobility PT LTG, [place] Level conditional independence --    Bed Mobility PT LTG, Date Goal Reviewed --  09/08/17   Bed Mobility PT LTG, Outcome --  goal ongoing       Goal: Transfer Training Goal 1 LTG- PT  Outcome: Ongoing (interventions implemented as appropriate)    09/07/17 1535 09/08/17 1631   Transfer Training PT LTG   Transfer Training PT LTG, Date Established 09/07/17 --    Transfer Training PT LTG, Time to Achieve 5 days --    Transfer Training PT LTG, Activity Type sit to stand/stand to sit --    Transfer Training PT LTG,  [place] Level conditional independence --    Transfer Training PT LTG, Assist Device walker, rolling --    Transfer Training PT LTG, Date Goal Reviewed --  09/08/17   Transfer Training PT LTG, Outcome --  goal ongoing       Goal: Gait Training Goal LTG- PT  Outcome: Ongoing (interventions implemented as appropriate)    09/07/17 1535 09/08/17 1631   Gait Training PT LTG   Gait Training Goal PT LTG, Date Established 09/07/17 --    Gait Training Goal PT LTG, Time to Achieve 5 days --    Gait Training Goal PT LTG, [place] Level conditional independence --    Gait Training Goal PT LTG, Assist Device walker, rolling --    Gait Training Goal PT LTG, Distance to Achieve 500 feet --    Gait Training Goal PT LTG, Date Goal Reviewed --  09/08/17   Gait Training Goal PT LTG, Outcome --  goal ongoing       Goal: Stair Training Goal LTG- PT  Outcome: Outcome(s) achieved Date Met:  09/08/17 09/07/17 1635 09/08/17 1631   Stair Training PT LTG   Stair Training Goal PT LTG, Date Established 09/07/17 --    Stair Training Goal PT LTG, Time to Achieve 5 days --    Stair Training Goal PT LTG, Number of Steps 2 --    Stair Training Goal PT LTG, [place] Level contact guard assist --    Stair Training Goal PT LTG, Assist Device cane, straight;other (see comments)  (HHA) --    Stair Training Goal PT LTG, Date Goal Reviewed --  09/08/17   Stair Training Goal PT LTG, Outcome --  goal met       Goal: Range of Motion Goal LTG- PT  Outcome: Ongoing (interventions implemented as appropriate)    09/07/17 1535 09/08/17 1631   Range of Motion PT LTG   Range of Motion Goal PT LTG, Date Established 09/07/17 --    Range of Motion Goal PT LTG, Time to Achieve 5 days --    Range fo Motion Goal PT LTG, Joint L knee --    Range of Motion Goal PT LTG, AAROM Measure 0-90 degrees --    Range of Motion Goal PT LTG, Date Goal Reviewed --  09/08/17   Range of Motion Goal PT LTG, Outcome --  goal ongoing       Goal: Stair Training Goal LTG-  PT  Outcome: Outcome(s) achieved Date Met:  09/08/17 09/07/17 1635 09/08/17 1631   Stair Training PT LTG   Stair Training Goal PT LTG, Date Established 09/07/17 --    Stair Training Goal PT LTG, Time to Achieve 5 days --    Stair Training Goal PT LTG, Number of Steps 2 --    Stair Training Goal PT LTG, [place] Level contact guard assist --    Stair Training Goal PT LTG, Assist Device cane, straight;other (see comments)  (HHA) --    Stair Training Goal PT LTG, Date Goal Reviewed --  09/08/17   Stair Training Goal PT LTG, Outcome --  goal met

## 2017-09-08 NOTE — DISCHARGE SUMMARY
Patient Name: Qi Gupta  MRN: [id number]  : 1950  DOS: 2017    Attending: Jacques Younger, *    Primary Care Provider: Marylu Kimbrough MD    Date of Admission:.2017  7:37 AM    Date of Discharge:  2017    Discharge Diagnosis: Principal Problem:    Status post total left knee replacement  Active Problems:    Osteoarthritis of left knee    Prediabetes    GERD (gastroesophageal reflux disease)      Hospital Course  Patient is a 66 y.o. female presented for left total knee arthroplasty by Dr. Younger under spinal anesthesia. She tolerated surgery well and was admitted for further medical management. Her knee has been painful for several years. She uses a can occasionally when the pain is severe.     Patient was provided pain medications as needed for pain control, along with adductor canal nerve block infusion of Ropivacaine.      She was seen by PT and OT and has progressed well over her stay.  She used an IS for atelectasis prophylaxis and aspirin along with mechanicals for DVT prophylaxis.  Home medications were resumed as appropriate, and labs were monitored and remained fairly stable.     Her Hgb A1C was elevated on her pre-op labs. A diabetes educator provided her with diabetic education and a glucometer.    With the progress she has made, she is ready for DC home today.    A prineo dressing is in place and is to remain for 2 weeks.  She will have an On Q pump ( instructed on it during this admit)  Discussed with patient regarding plan and she shows understanding and agreement.    Patient will have HHPT following discharge.      Procedures Performed  Preoperative diagnosis: Left knee end stage osteoarthritis     Postoperative diagnosis: Left knee end stage osteoarthritis     Operative procedure: Left total knee arthroplasty     Surgeon: Dr. Isak Younger       Pertinent Test Results:    I reviewed the patient's new clinical results.     Results from last 7 days  Lab Units  "17  0722 17  0621   WBC 10*3/mm3 10.54 10.61   HEMOGLOBIN g/dL 10.9* 12.9   HEMATOCRIT % 33.4* 38.2   PLATELETS 10*3/mm3 199 238       Results from last 7 days  Lab Units 17  0621   SODIUM mmol/L 137   POTASSIUM mmol/L 3.7   CHLORIDE mmol/L 104   CO2 mmol/L 27.0   BUN mg/dL 7*   CREATININE mg/dL 0.80   CALCIUM mg/dL 8.7   GLUCOSE mg/dL 107*     I reviewed the patient's new imaging including images and reports.      Physical therapy:    Discharge Assessment:    Vital Signs  /74 (BP Location: Right arm, Patient Position: Lying)  Pulse 98  Temp 99.6 °F (37.6 °C) (Oral)   Resp 16  Ht 61\" (154.9 cm)  Wt 149 lb 0.5 oz (67.6 kg)  SpO2 94%  BMI 28.16 kg/m2  Temp (24hrs), Av.7 °F (37.6 °C), Min:99.1 °F (37.3 °C), Max:100.3 °F (37.9 °C)      General Appearance:    Alert, cooperative, in no acute distress   Lungs:     Clear to auscultation,respirations regular, even and                   unlabored    Heart:    Regular rhythm and normal rate, normal S1 and S2   Abdomen:     Normal bowel sounds, no masses, no organomegaly, soft        non-tender, non-distended, no guarding, no rebound                 tenderness   Extremities:   Moves all extremities well, no edema, no cyanosis, no              Redness. Left knee Prineo CDI. Nerve block present   Pulses:   Pulses palpable and equal bilaterally   Skin:   No bleeding, bruising or rash   Neurologic:   Cranial nerves 2 - 12 grossly intact, sensation intact. Flexion and dorsiflexion intact bilateral feet.       Discharge Disposition: Home    Discharge Medications   Qi Gupta   Home Medication Instructions ACACIA:[id number]    Printed on:17 0957   Medication Information                      aspirin  MG EC tablet  Take 1 tablet by mouth Daily. For 1 month             calcium carbonate EX (TUMS EX) 750 MG chewable tablet  Chew 750 mg As Needed for Indigestion or Heartburn.             docusate sodium 100 MG capsule  Take 100 mg by mouth 2 " (Two) Times a Day As Needed for Constipation.             HYDROcodone-acetaminophen (NORCO) 7.5-325 MG per tablet  Take 1 tablet by mouth Every 4 (Four) Hours As Needed for Moderate Pain  for up to 9 days.             psyllium (METAMUCIL) 58.6 % packet  Take 1 packet by mouth Daily.             Ropivacaine HCl-NaCl (NAROPIN) 0.2-0.9 %  24 mg/hr by Peripheral Nerve route Continuous.                 Discharge Diet: Consistent carb diet    Activity at Discharge: WBAT LLE    Follow-up Appointments  Dr. Younger per his orders    Discharge took over 30 min.  Discussed with patient and . Answered all questions.      Madiha Broussard MD  09/09/17  9:57 AM

## 2017-09-08 NOTE — PLAN OF CARE
Problem: Patient Care Overview (Adult)  Goal: Plan of Care Review  Outcome: Ongoing (interventions implemented as appropriate)    Problem: Perioperative Period (Adult)  Goal: Signs and Symptoms of Listed Potential Problems Will be Absent or Manageable (Perioperative Period)  Outcome: Ongoing (interventions implemented as appropriate)    Problem: Knee Replacement, Total (Adult)  Goal: Signs and Symptoms of Listed Potential Problems Will be Absent or Manageable (Knee Replacement, Total)  Outcome: Ongoing (interventions implemented as appropriate)    Problem: Fall Risk (Adult)  Goal: Identify Related Risk Factors and Signs and Symptoms  Outcome: Ongoing (interventions implemented as appropriate)  Goal: Absence of Falls  Outcome: Ongoing (interventions implemented as appropriate)

## 2017-09-08 NOTE — NURSING NOTE
Acute Pain Service:  Patient plans to discharge this weekend. On-Q teaching completed with patient.  Video demonstration, handout and bracelet provided with [facility] on call central phone number.  Instructed to call with any questions or concerns.  Patient verbalized understanding.  Service will continue to follow until catheter DC'd.  Please contact patient at [phone]  if needed.

## 2017-09-08 NOTE — THERAPY TREATMENT NOTE
Acute Care - Physical Therapy Treatment Note  [facility]     Patient Name: Qi Gupta  : 1950  MRN: [id number]  Today's Date: 2017  Onset of Illness/Injury or Date of Surgery Date: 17  Date of Referral to PT: 17  Referring Physician: MD Aren    Admit Date: 2017    Visit Dx:    ICD-10-CM ICD-9-CM   1. Impaired functional mobility, balance, gait, and endurance Z74.09 V49.89   2. Status post total left knee replacement Z96.652 V43.65   3. Impaired mobility and ADLs Z74.09 799.89     Patient Active Problem List   Diagnosis   • Osteoarthritis of left knee   • Status post total left knee replacement   • Prediabetes   • GERD (gastroesophageal reflux disease)               Adult Rehabilitation Note       17 1434 17 1042 17 1036    Rehab Assessment/Intervention    Discipline physical therapist  -MJ  physical therapist  -MJ    Document Type therapy note (daily note)  -MJ  therapy note (daily note)  -MJ    Subjective Information agree to therapy;complains of;pain  -MJ  agree to therapy;complains of;pain  -MJ    Patient Effort, Rehab Treatment good  -MJ  good  -MJ    Symptoms Noted During/After Treatment increased pain  -MJ  none  -MJ    Symptoms Noted Comment RN notified  -MJ      Precautions/Limitations fall precautions;other (see comments)   L adductor nerve catheter  -MJ  fall precautions;other (see comments)   L adductor nerve catheter  -MJ    Recorded by [MJ] Bonifacio Linares, PT  [MJ] Bonifacio Linares PT    Pain Assessment    Pain Assessment 0-10  -MJ  0-10  -MJ    Pain Score 4  -MJ  6  -MJ    Post Pain Score 6  -MJ  6  -MJ    Pain Type Acute pain  -MJ  Acute pain  -MJ    Pain Location Knee  -MJ  Knee  -MJ    Pain Orientation Left;Anterior  -MJ  Left;Anterior;Outer  -MJ    Pain Intervention(s) Repositioned;Ambulation/increased activity  -MJ  Repositioned;Ambulation/increased activity  -MJ    Recorded by [MJ] Bonifacio Linares, PT  [MJ] Bonifacio Linares PT    Cognitive  Assessment/Intervention    Current Cognitive/Communication Assessment functional  -MJ  functional  -MJ    Orientation Status oriented x 4  -MJ  oriented x 4  -MJ    Follows Commands/Answers Questions 100% of the time;able to follow multi-step instructions;needs cueing  -MJ  100% of the time;able to follow multi-step instructions;needs cueing  -MJ    Personal Safety mild impairment;impulsive  -MJ  mild impairment;impulsive  -MJ    Recorded by [MJ] Bonifacio Linares, PT  [MJ] Bonifacio Linares, PT    General LE Assessment    ROM Detail L knee 10-59 degrees; pt lacking 10 degrees from full extension; pt seated to measure AAROM flexion  -MJ      Recorded by [MJ] Bonifacio Linares, PT      Mobility Assessment/Training    Extremity Weight-Bearing Status left lower extremity  -MJ  left lower extremity  -MJ    Left Lower Extremity Weight-Bearing weight-bearing as tolerated  -MJ  weight-bearing as tolerated  -MJ    Recorded by [MJ] Bonifacio Linares, PT  [MJ] Bonifacio Linares, PT    Bed Mobility, Assessment/Treatment    Bed Mobility, Assistive Device bed rails;head of bed elevated;leg   -MJ  bed rails;head of bed elevated;leg   -MJ    Bed Mob, Supine to Sit, [place] supervision required;verbal cues required  -MJ  contact guard assist;verbal cues required  -MJ    Bed Mob, Sit to Supine, [place] supervision required;verbal cues required  -MJ  not tested   Pt UIC  -MJ    Bed Mobility, Safety Issues decreased use of legs for bridging/pushing  -MJ  decreased use of legs for bridging/pushing  -MJ    Bed Mobility, Impairments ROM decreased;strength decreased;pain  -MJ  ROM decreased;strength decreased;pain  -MJ    Bed Mobility, Comment Verbal cues for sequencing with leg   -MJ  Verbal cues for sequencing with leg , increased time and effort to perform  -MJ    Recorded by [MJ] Bonifacio Linares, PT  [MJ] Bonifacio Lianres, PT    Transfer Assessment/Treatment    Transfers, Sit-Stand [place] stand by assist;verbal cues required  -MJ   contact guard assist;verbal cues required  -MJ    Transfers, Stand-Sit [place] stand by assist;verbal cues required  -MJ  contact guard assist;verbal cues required  -MJ    Transfers, Sit-Stand-Sit, Assist Device rolling walker  -MJ  rolling walker  -MJ    Transfer, Safety Issues sequencing ability decreased;step length decreased;weight-shifting ability decreased  -MJ  sequencing ability decreased;step length decreased;weight-shifting ability decreased  -MJ    Transfer, Impairments ROM decreased;strength decreased;pain  -MJ  ROM decreased;strength decreased;pain  -MJ    Transfer, Comment Verbal cues for correct hand placement and to step L LE out prior to t/f. Pt stood before gait belt donned and RW in front of her despite cues  -MJ  Verbal cues for correct hand placement and to step L LE out prior to t/f. Pt stood before gait belt donned and RW in front of her, cues for safety  -MJ    Recorded by [MJ] Bonifacio Linares PT  [MJ] Bonifacio Linares, PT    Gait Assessment/Treatment    Gait, [place] Level contact guard assist;verbal cues required  -MJ  contact guard assist;verbal cues required  -MJ    Gait, Assistive Device rolling walker  -MJ  rolling walker  -MJ    Gait, Distance (Feet) 180  -MJ  250  -MJ    Gait, Gait Pattern Analysis swing-through gait  -MJ  swing-through gait  -MJ    Gait, Gait Deviations left:;antalgic;kristen decreased;step length decreased;weight-shifting ability decreased  -MJ  left:;antalgic;kristen decreased;step length decreased;weight-shifting ability decreased  -MJ    Gait, Safety Issues step length decreased;weight-shifting ability decreased  -MJ  step length decreased;weight-shifting ability decreased  -MJ    Gait, Impairments ROM decreased;strength decreased;pain  -MJ  ROM decreased;strength decreased;pain  -MJ    Gait, Comment Pt demo step through gait pattern with smooth sequencing. Cues to increase LE weight bearing, improvement noted. Gait limited by pain and fatigue  -MJ  Pt demo  step through gait pattern at slow pace. Verbal cues to increase LE weight bearing, improvement noted. Pt exhibits good heel strike and sequencing with RW without cues. Gait limited by pain and fatigue  -    Recorded by [MJ] Bonifacio Linares PT  [MJ] Bonifacio Linares PT    Stairs Assessment/Treatment    Number of Stairs 6  -      Stairs, Handrail Location --   x3 w/ 2 HR; x2 w/ HHA and cane; x1 w/ RW  -      Stairs, [place] Level contact guard assist;verbal cues required  -      Stairs, Assistive Device --   RW for 1 step; cane for 2 steps in sequence  -      Stairs, Technique Used step to step (ascending);step to step (descending)  -      Stairs, Safety Issues sequencing ability decreased;weight-shifting ability decreased  -      Stairs, Impairments ROM decreased;strength decreased;pain  -      Stairs, Comment Pt performed non-reciprocally. Verbal cues to ascend leading with R LE and to descend leading with L LE. Pt performed 1 step backward with RW and performed 2 steps with HHA and cane, requiring cues for sequencing with cane  -      Recorded by [MJ] Bonifacio Linares PT      Functional Mobility    Functional Mobility- Device  rolling walker  -     Functional Mobility-Distance (Feet)  250  -     Recorded by  [AC] Sugar Flores OT     Lower Body Dressing Assessment/Training    LB Dressing Assess/Train, Clothing Type  doffing:;donning:;slipper socks  -     LB Dressing Assess/Train, Assist Device  reacher;sock-aid  -     LB Dressing Assess/Train, Position  sitting  -     LB Dressing Assess/Train, [place]  verbal cues required;minimum assist (75% patient effort)  -     Recorded by  [AC] Sugar Flores, OT     Therapy Exercises    Exercise Protocols total knee  -  total knee  -    Total Knee Exercises left:;15 reps;completed protocol;with assist;ankle pumps/circles;quad set;glut set;heel slide stretch;SLR;SAQ;hip abduction/adduction;LAQ   Cues for technique. Ady w/ SLR, hip abd  -   left:;15 reps;completed protocol;with assist;ankle pumps/circles;quad set;glut set;heel slide stretch;SLR;SAQ;hip abduction/adduction;LAQ   Cues for technique. Ady w/ SLR, hip abd, SAQ  -MJ    Recorded by [MJ] Bonifacio Linares, PT  [MJ] Bonifacio Linares PT    Positioning and Restraints    Pre-Treatment Position in bed  -MJ in bed  -AC in bed  -MJ    Post Treatment Position bed  -MJ chair  -AC chair  -MJ    In Bed notified nsg;supine;call light within reach;encouraged to call for assist;with family/caregiver  -MJ      In Chair  notified nsg;reclined;call light within reach;encouraged to call for assist  -AC notified nsg;reclined;call light within reach;encouraged to call for assist  -MJ    Recorded by [MJ] Bonifacio Linares PT [AC] Sugar Flores, OT [MJ] Bonifacio Linares PT      User Key  (r) = Recorded By, (t) = Taken By, (c) = Cosigned By    Initials Name Effective Dates    AC Sugar Flores, OT 06/23/15 -     MJ Bonifacio Linares PT 03/14/16 -                 IP PT Goals       09/08/17 1631 09/08/17 1317 09/07/17 1635    Bed Mobility PT LTG    Bed Mobility PT LTG, Date Goal Reviewed 09/08/17  -MJ 09/08/17  -MJ     Bed Mobility PT LTG, Outcome goal ongoing  - goal ongoing  -MJ     Transfer Training PT LTG    Transfer Training PT  LTG, Date Goal Reviewed 09/08/17  -MJ 09/08/17  -MJ     Transfer Training PT LTG, Outcome goal ongoing  - goal ongoing  -MJ     Gait Training PT LTG    Gait Training Goal PT LTG, Date Goal Reviewed 09/08/17  -MJ 09/08/17  -MJ     Gait Training Goal PT LTG, Outcome goal ongoing  - goal ongoing  -MJ     Stair Training PT LTG    Stair Training Goal PT LTG, Date Established   09/07/17  -LR    Stair Training Goal PT LTG, Time to Achieve   5 days  -LR    Stair Training Goal PT LTG, Number of Steps   2  -LR    Stair Training Goal PT LTG, [place] Level   contact guard assist  -LR    Stair Training Goal PT LTG, Assist Device   cane, straight;other (see comments)   HHA  -LR    Stair Training Goal PT LTG, Date  Goal Reviewed 09/08/17  -MJ 09/08/17  -MJ 09/07/17  -LR    Stair Training Goal PT LTG, Outcome goal met  -MJ goal ongoing  -MJ goal ongoing  -LR    Range of Motion PT LTG    Range of Motion Goal PT LTG, Date Goal Reviewed 09/08/17  -MJ 09/08/17  -MJ     Range of Motion Goal PT LTG, Outcome goal ongoing  -MJ goal ongoing  -MJ       09/07/17 1535          Bed Mobility PT LTG    Bed Mobility PT LTG, Date Established 09/07/17  -LR      Bed Mobility PT LTG, Time to Achieve 5 days  -LR      Bed Mobility PT LTG, Activity Type supine to sit/sit to supine  -LR      Bed Mobility PT LTG, [place] Level conditional independence  -LR      Bed Mobility PT LTG, Date Goal Reviewed 09/07/17  -LR      Bed Mobility PT LTG, Outcome goal ongoing  -LR      Transfer Training PT LTG    Transfer Training PT LTG, Date Established 09/07/17  -LR      Transfer Training PT LTG, Time to Achieve 5 days  -LR      Transfer Training PT LTG, Activity Type sit to stand/stand to sit  -LR      Transfer Training PT LTG, [place] Level conditional independence  -LR      Transfer Training PT LTG, Assist Device walker, rolling  -LR      Transfer Training PT  LTG, Date Goal Reviewed 09/07/17  -LR      Transfer Training PT LTG, Outcome goal ongoing  -LR      Gait Training PT LTG    Gait Training Goal PT LTG, Date Established 09/07/17  -LR      Gait Training Goal PT LTG, Time to Achieve 5 days  -LR      Gait Training Goal PT LTG, [place] Level conditional independence  -LR      Gait Training Goal PT LTG, Assist Device walker, rolling  -LR      Gait Training Goal PT LTG, Distance to Achieve 500 feet  -LR      Gait Training Goal PT LTG, Date Goal Reviewed 09/07/17  -LR      Gait Training Goal PT LTG, Outcome goal ongoing  -LR      Stair Training PT LTG    Stair Training Goal PT LTG, Date Established 09/07/17  -LR      Stair Training Goal PT LTG, Time to Achieve 5 days  -LR      Stair Training Goal PT LTG, Number of Steps 1  -LR      Stair Training  Goal PT LTG, [place] Level conditional independence  -LR      Stair Training Goal PT LTG, Assist Device walker, rolling;other (see comments)   backwards  -LR      Stair Training Goal PT LTG, Date Goal Reviewed 09/07/17  -LR      Stair Training Goal PT LTG, Outcome goal ongoing  -LR      Range of Motion PT LTG    Range of Motion Goal PT LTG, Date Established 09/07/17  -LR      Range of Motion Goal PT LTG, Time to Achieve 5 days  -LR      Range fo Motion Goal PT LTG, Joint L knee  -LR      Range of Motion Goal PT LTG, AAROM Measure 0-90 degrees  -LR      Range of Motion Goal PT LTG, Date Goal Reviewed 09/07/17  -LR      Range of Motion Goal PT LTG, Outcome goal ongoing  -LR        User Key  (r) = Recorded By, (t) = Taken By, (c) = Cosigned By    Initials Name Provider Type    LR Yenny Lujan, PT Physical Therapist    LANE Linares, PT Physical Therapist          Physical Therapy Education     Title: PT OT SLP Therapies (Active)     Topic: Physical Therapy (Done)     Point: Mobility training (Done)    Learning Progress Summary    Learner Readiness Method Response Comment Documented by Status   Patient Acceptance E,D JENIFFER,NR Reviewed HEP, gait mechanics, stairs, benefits of early ROM  09/08/17 1630 Done    Acceptance E,D JENIFFER SHELTON Reviewed gait mechanics, HEP  09/08/17 1316 Done    Acceptance DE JENIFFERNR Educated on weight bearing status and correct gait mechanics.  09/07/17 1728 Done   Significant Other Acceptance E,D JENIFFERNR Reviewed HEP, gait mechanics, stairs, benefits of early ROM  09/08/17 1630 Done               Point: Home exercise program (Done)    Learning Progress Summary    Learner Readiness Method Response Comment Documented by Status   Patient Acceptance E,D VU,NR Reviewed HEP, gait mechanics, stairs, benefits of early ROM  09/08/17 1630 Done    Acceptance E,D JENIFFER SHELTON Reviewed gait mechanics, HEP  09/08/17 1316 Done    Acceptance D,E JENIFFERNR Educated on weight bearing status and correct gait  mechanics.  09/07/17 1728 Done   Significant Other Acceptance E,D VU,NR Reviewed HEP, gait mechanics, stairs, benefits of early ROM  09/08/17 1630 Done               Point: Body mechanics (Done)    Learning Progress Summary    Learner Readiness Method Response Comment Documented by Status   Patient Acceptance E,D VU,NR Reviewed HEP, gait mechanics, stairs, benefits of early ROM  09/08/17 1630 Done    Acceptance E,D NR,VU Reviewed gait mechanics, HEP  09/08/17 1316 Done    Acceptance D,E VU,NR Educated on weight bearing status and correct gait mechanics.  09/07/17 1728 Done   Significant Other Acceptance E,D VU,NR Reviewed HEP, gait mechanics, stairs, benefits of early ROM  09/08/17 1630 Done               Point: Precautions (Done)    Learning Progress Summary    Learner Readiness Method Response Comment Documented by Status   Patient Acceptance E,D VU,NR Reviewed HEP, gait mechanics, stairs, benefits of early ROM  09/08/17 1630 Done    Acceptance E,D NR,VU Reviewed gait mechanics, HEP  09/08/17 1316 Done    Acceptance D,E VU,NR Educated on weight bearing status and correct gait mechanics.  09/07/17 1728 Done   Significant Other Acceptance E,D VU,NR Reviewed HEP, gait mechanics, stairs, benefits of early ROM  09/08/17 1630 Done                      User Key     Initials Effective Dates Name Provider Type Discipline     06/19/15 -  Yenny Lujan, PT Physical Therapist PT     03/14/16 -  Bonifacio Linares, PT Physical Therapist PT                    PT Recommendation and Plan  Anticipated Equipment Needs At Discharge: front wheeled walker  Anticipated Discharge Disposition: home with assist, home with home health  Planned Therapy Interventions: balance training, bed mobility training, gait training, home exercise program, patient/family education, ROM (Range of Motion), stair training, strengthening, transfer training  PT Frequency: 2 times/day  Plan of Care Review  Plan Of Care Reviewed With:  patient  Progress: improving  Outcome Summary/Follow up Plan: Pt ambulated 180 feet with CGA and RW and progressed to stair training, meeting stair training goals. Pt's ROM measured 10-59 degrees. Pt anticipates discharge home tomorrow, will continue to progress mobility as able          Outcome Measures       09/08/17 1434 09/08/17 1042 09/08/17 1036    How much help from another person do you currently need...    Turning from your back to your side while in flat bed without using bedrails? 3  -MJ  3  -MJ    Moving from lying on back to sitting on the side of a flat bed without bedrails? 3  -MJ  3  -MJ    Moving to and from a bed to a chair (including a wheelchair)? 3  -MJ  3  -MJ    Standing up from a chair using your arms (e.g., wheelchair, bedside chair)? 3  -MJ  3  -MJ    Climbing 3-5 steps with a railing? 3  -MJ  3  -MJ    To walk in hospital room? 3  -MJ  3  -MJ    AM-PAC 6 Clicks Score 18  -MJ  18  -MJ    How much help from another is currently needed...    Putting on and taking off regular lower body clothing?  2  -AC     Bathing (including washing, rinsing, and drying)  3  -AC     Toileting (which includes using toilet bed pan or urinal)  3  -AC     Putting on and taking off regular upper body clothing  4  -AC     Taking care of personal grooming (such as brushing teeth)  4  -AC     Eating meals  4  -AC     Score  20  -AC     Functional Assessment    Outcome Measure Options AM-PAC 6 Clicks Basic Mobility (PT)  -MJ AM-PAC 6 Clicks Daily Activity (OT)  -AC AM-PAC 6 Clicks Basic Mobility (PT)  -MJ      09/07/17 1535          How much help from another person do you currently need...    Turning from your back to your side while in flat bed without using bedrails? 3  -LR      Moving from lying on back to sitting on the side of a flat bed without bedrails? 3  -LR      Moving to and from a bed to a chair (including a wheelchair)? 3  -LR      Standing up from a chair using your arms (e.g., wheelchair, bedside  chair)? 3  -LR      Climbing 3-5 steps with a railing? 2  -LR      To walk in hospital room? 3  -LR      AM-PAC 6 Clicks Score 17  -LR      Functional Assessment    Outcome Measure Options AM-PAC 6 Clicks Basic Mobility (PT)  -LR        User Key  (r) = Recorded By, (t) = Taken By, (c) = Cosigned By    Initials Name Provider Type    AC Sugar Flores, OT Occupational Therapist    LR Yenny Lujan, PT Physical Therapist    LANE Linares, PT Physical Therapist           Time Calculation:         PT Charges       09/08/17 1634 09/08/17 1319       Time Calculation    Start Time 1434  -MJ 1036  -MJ     PT Received On 09/08/17  -MJ 09/08/17  -MJ     PT Goal Re-Cert Due Date 09/17/17  -MJ 09/17/17  -MJ     Time Calculation- PT    Total Timed Code Minutes- PT 24 minute(s)  -MJ 25 minute(s)  -MJ       User Key  (r) = Recorded By, (t) = Taken By, (c) = Cosigned By    Initials Name Provider Type    LANE Linares, PT Physical Therapist          Therapy Charges for Today     Code Description Service Date Service Provider Modifiers Qty    61039760516 HC GAIT TRAINING EA 15 MIN 9/8/2017 Bonifacio Linares, PT GP 1    52035113834 HC PT THER PROC EA 15 MIN 9/8/2017 Bonifacio Linares PT GP 1    98365019595 HC GAIT TRAINING EA 15 MIN 9/8/2017 Bonifacio Linares, PT GP 1    34903224729 HC PT THER PROC EA 15 MIN 9/8/2017 Bonifacio Linares, PT GP 1          PT G-Codes  Outcome Measure Options: AM-PAC 6 Clicks Basic Mobility (PT)    Bonifacio Linares PT  9/8/2017

## 2017-09-08 NOTE — DISCHARGE PLACEMENT REQUEST
"Maranda Jimenez (66 y.o. Female)       Cary, RN Case Manager, [phone]        Date of Birth Social Security Number Address Home Phone MRN    1950  [id number] [facility] [address]  [facility] [address] [phone] [id number]    Anglican Marital Status          Non-Lutheran        Admission Date Admission Type Admitting Provider Attending Provider Department, Room/Bed    9/7/17 Elective Jacques Younger MD Coy, Samuel Christopher, MD [facility] 5E, S529/1    Discharge Date Discharge Disposition Discharge Destination                      Attending Provider: Jacques Younger MD     Allergies:  Ceftin [Cefuroxime Axetil]    Isolation:  None   Infection:  None   Code Status:  FULL    Ht:  61\" (154.9 cm)   Wt:  149 lb 0.5 oz (67.6 kg)    Admission Cmt:  None   Principal Problem:  Status post total left knee replacement [Z96.652]                 Active Insurance as of 9/7/2017     Primary Coverage     Payor Plan Insurance Group Employer/Plan Group    MEDICARE MEDICARE A & B      Payor Plan Address Payor Plan Phone Number Effective From Effective To    [address] [phone] 11/1/2016     [place] [address]       Subscriber Name Subscriber Birth Date Member ID       MARANDA JIMENEZ 1950 [id number]           Secondary Coverage     Payor Plan Insurance Group Employer/Plan Group    [employer] [place] [employer] [place]      Payor Plan Address Payor Plan Phone Number Effective From Effective To    [employer] [place] [phone] 11/1/2016     [place] [id number]       Subscriber Name Subscriber Birth Date Member ID       MARANDA JIMENEZ 1950 [id number]                 Emergency Contacts      (Rel.) Home Phone Work Phone Mobile Phone    Gianluca Jimenez (Spouse) [phone] -- [phone]            Insurance Information                MEDICARE/MEDICARE A & B Phone: [phone]    Subscriber: Maranda Jimenez Subscriber#: [id number]    Group#:  Precert#:         [employer] " [facility] Phone: [phone]    Subscriber: Maranda Jimenez Subscriber#: [id number]    Group#:  Precert#:              History & Physical      H&P filed by Lisa Keller MD at 9/6/2017  7:56 PM      Scan on 9/7/2017 : H&P [facility] [facility] 08/18/2017 (below)              Electronically signed by Interface, Scans Incoming at 9/6/2017  7:56 PM      BUDDY Cheek at 9/7/2017  8:50 AM     Attestation signed by Jacques Younger MD at 9/7/2017  9:39 AM        H&P reviewed, agree with plan                                 H&P reviewed. The patient was examined and there are no changes to the H&P.     Temp:  [98 °F (36.7 °C)] 98 °F (36.7 °C)  Heart Rate:  [62] 62  BP: (162)/(84) 162/84     Heart: RRR  Lungs: CTAB    Immunizations:    Tetanus: No     Influenza:No     Pneumo:No    Labs were reviewed.       Results for MARANDA JIMENEZ (MRN [id number]) as of 9/7/2017 08:51   Ref. Range 8/29/2017 12:55   Glucose Latest Ref Range: 70 - 100 mg/dL 127 (H)   Sodium Latest Ref Range: 132 - 146 mmol/L 141   Potassium Latest Ref Range: 3.5 - 5.5 mmol/L 4.2   CO2 Latest Ref Range: 20.0 - 31.0 mmol/L 31.0   Chloride Latest Ref Range: 99 - 109 mmol/L 106   Anion Gap Latest Ref Range: 3.0 - 11.0 mmol/L 4.0   Creatinine Latest Ref Range: 0.60 - 1.30 mg/dL 0.80   BUN Latest Ref Range: 9 - 23 mg/dL 9   BUN/Creatinine Ratio Latest Ref Range: 7.0 - 25.0  11.3   Calcium Latest Ref Range: 8.7 - 10.4 mg/dL 9.9   eGFR Non African Amer Latest Ref Range: >60 mL/min/1.73 72   Hemoglobin A1C Latest Ref Range: 4.80 - 5.60 % 5.70 (H)   WBC Latest Ref Range: 3.50 - 10.80 10*3/mm3 9.24   RBC Latest Ref Range: 3.89 - 5.14 10*6/mm3 4.76   Hemoglobin Latest Ref Range: 11.5 - 15.5 g/dL 14.5   Hematocrit Latest Ref Range: 34.5 - 44.0 % 43.5   RDW Latest Ref Range: 11.3 - 14.5 % 13.9   MCV Latest Ref Range: 80.0 - 99.0 fL 91.4   MCH Latest Ref Range: 27.0 - 31.0 pg 30.5   MCHC Latest Ref Range: 32.0 - 36.0 g/dL 33.3   MPV  Latest Ref Range: 6.0 - 12.0 fL 11.2   Platelets Latest Ref Range: 150 - 450 10*3/mm3 273   RDW-SD Latest Ref Range: 37.0 - 54.0 fl 46.6   Neutrophil % Latest Ref Range: 41.0 - 71.0 % 73.2 (H)   Lymphocyte % Latest Ref Range: 24.0 - 44.0 % 19.3 (L)   Monocyte % Latest Ref Range: 0.0 - 12.0 % 5.2   Eosinophil % Latest Ref Range: 0.0 - 3.0 % 1.5   Basophil % Latest Ref Range: 0.0 - 1.0 % 0.6   Immature Grans % Latest Ref Range: 0.0 - 0.6 % 0.2   Neutrophils, Absolute Latest Ref Range: 1.50 - 8.30 10*3/mm3 6.76   Lymphocytes, Absolute Latest Ref Range: 0.60 - 4.80 10*3/mm3 1.78   Monocytes, Absolute Latest Ref Range: 0.00 - 1.00 10*3/mm3 0.48   Eosinophils, Absolute Latest Ref Range: 0.00 - 0.30 10*3/mm3 0.14   Basophils, Absolute Latest Ref Range: 0.00 - 0.20 10*3/mm3 0.06   Immature Grans, Absolute Latest Ref Range: 0.00 - 0.03 10*3/mm3 0.02     EKG: NSR    Plan: left TKA           Electronically signed by Jacques Younger MD at 2017  9:39 AM    Source Note          Scan on 2017 : [facility] 2017 (below)              Electronically signed by Interface, Scans Incoming at 2017  7:56 PM            Madiha Broussard MD at 2017 12:22 PM          Patient Name: Qi Gupta  MRN: [id number]  : 1950  DOS: 2017    Attending: Jacques Younger, *    Primary Care Provider: Marylu Kimbrough MD      Chief complaint:  Left knee pain    Subjective   Patient is a 66 y.o. female presented for left total knee arthroplasty by Dr. Younger under spinal anesthesia. She tolerated surgery well and is admitted for further medical management. Her knee has been painful for several years. She uses a can occasionally when the pain is severe.     When seen in PACU she is doing well. She denies pain, but is still under effects of spinal anesthesia. She denies nausea, shortness of breath or chest pain. No hx of DVT or PE.    ( When seen later in her room, she continues to  do well.  Effects of spinal anesthesia have subsided.  She has some mild pain and she has received Lortab.  No nausea or vomiting, no fever or chills, no shortness of breath or chest pain.  She is awaiting ambulating with PT.)wy     Allergies:  Allergies   Allergen Reactions   • Ceftin [Cefuroxime Axetil] Hives and Itching       Meds:  Prescriptions Prior to Admission   Medication Sig Dispense Refill Last Dose   • calcium carbonate EX (TUMS EX) 750 MG chewable tablet Chew 750 mg As Needed for Indigestion or Heartburn.   Past Week at Unknown time   • naproxen sodium (ALEVE) 220 MG tablet Take 220 mg by mouth 2 (Two) Times a Day As Needed for Mild Pain .   Past Month at Unknown time   • psyllium (METAMUCIL) 58.6 % packet Take 1 packet by mouth Daily.   9/6/2017 at 0830   • HYDROcodone-acetaminophen (NORCO)  MG per tablet Take 1 tablet by mouth Every 6 (Six) Hours As Needed for Moderate Pain .   8/29/2017       History:   Past Medical History:   Diagnosis Date   • Acid reflux    • Arthritis    • Diverticulosis    • Hypertension     Borderline   • Knee pain, left    • Mitral valve prolapse    • Wears glasses      Past Surgical History:   Procedure Laterality Date   • BIOPSY SKIN / SQ / MUCOUS MEMBRANE      MOLE- UPPER RIGHT LEG   • COLONOSCOPY     • LAPAROSCOPIC TUBAL LIGATION       History reviewed. No pertinent family history.  Social History   Substance Use Topics   • Smoking status: Former Smoker     Packs/day: 0.25     Years: 30.00     Types: Cigarettes     Quit date: 8/29/1999   • Smokeless tobacco: Never Used   • Alcohol use No   She is  with 2 children. She is retired from factory.    Review of Systems  Pertinent items are noted in HPI, all other systems reviewed and negative    Vital Signs  /79 (BP Location: Right arm)  Pulse 85  Temp 97.6 °F (36.4 °C) (Oral)   Resp 16  SpO2 98%    Physical Exam:    General Appearance:    Alert, cooperative, in no acute distress   Head:    Normocephalic,  without obvious abnormality, atraumatic   Eyes:            Lids and lashes normal, conjunctivae and sclerae normal, no   icterus, no pallor, corneas clear, PERRLA   Ears:    Ears appear intact with no abnormalities noted   Neck:   No adenopathy, supple, trachea midline, no thyromegaly, no     carotid bruit, no JVD   Lungs:     Clear to auscultation,respirations regular, even and                   unlabored    Heart:    Regular rhythm and normal rate, normal S1 and S2, no            murmur, no gallop    Abdomen:     Normal bowel sounds, no masses, no organomegaly, soft        non-tender, non-distended, no guarding, no rebound                 tenderness   Genitalia:    Deferred   Extremities:   Left knee ACE wrap CDI. Nerve block present   Pulses:   Pulses palpable and equal bilaterally   Skin:   No bleeding, bruising or rash   Neurologic:   Cranial nerves 2 - 12 grossly intact, sensation intact and movement limited due to spinal block effects  Exam later: Flexion and dorsiflexion intact bilateral feet.  Normal sensation distal LEs.wy        I reviewed the patient's new clinical results.     Results for MARANDA JIMENEZ (MRN [id number]) as of 9/7/2017 12:23   Ref. Range 8/29/2017 12:55   Glucose Latest Ref Range: 70 - 100 mg/dL 127 (H)   Sodium Latest Ref Range: 132 - 146 mmol/L 141   Potassium Latest Ref Range: 3.5 - 5.5 mmol/L 4.2   CO2 Latest Ref Range: 20.0 - 31.0 mmol/L 31.0   Chloride Latest Ref Range: 99 - 109 mmol/L 106   Anion Gap Latest Ref Range: 3.0 - 11.0 mmol/L 4.0   Creatinine Latest Ref Range: 0.60 - 1.30 mg/dL 0.80   BUN Latest Ref Range: 9 - 23 mg/dL 9   BUN/Creatinine Ratio Latest Ref Range: 7.0 - 25.0  11.3   Calcium Latest Ref Range: 8.7 - 10.4 mg/dL 9.9   eGFR Non African Amer Latest Ref Range: >60 mL/min/1.73 72   Hemoglobin A1C Latest Ref Range: 4.80 - 5.60 % 5.70 (H)   WBC Latest Ref Range: 3.50 - 10.80 10*3/mm3 9.24   RBC Latest Ref Range: 3.89 - 5.14 10*6/mm3 4.76   Hemoglobin Latest Ref Range:  11.5 - 15.5 g/dL 14.5   Hematocrit Latest Ref Range: 34.5 - 44.0 % 43.5   RDW Latest Ref Range: 11.3 - 14.5 % 13.9   MCV Latest Ref Range: 80.0 - 99.0 fL 91.4   MCH Latest Ref Range: 27.0 - 31.0 pg 30.5   MCHC Latest Ref Range: 32.0 - 36.0 g/dL 33.3   MPV Latest Ref Range: 6.0 - 12.0 fL 11.2   Platelets Latest Ref Range: 150 - 450 10*3/mm3 273     Assessment and Plan:   Principal Problem:    Status post total left knee replacement  Active Problems:    Osteoarthritis of left knee    Prediabetes    GERD (gastroesophageal reflux disease)      Plan  1. PT/OT- early ambulation post op  2. Pain control-prns, AC nerve block  3. IS-encourage  4. DVT proph- mechs/ASA  5. Bowel regimen  6. Resume home medications as appropriate  7. Monitor post-op labs, BG  8. DC planning for home with HHPT    GERD  - Continue home Tums  PreDM  - hgb A1c on 8/29/17 5.7  - Accuchecks ACHS with low dose SSI  - DM educator consult    Seen and examined by me. Agree with above, plan formulated with APRN . Discussed with patient and family.     Madiha Broussard MD  09/07/17  3:53 PM     Electronically signed by Madiha Broussard MD at 9/7/2017  3:54 PM           Operative/Procedure Notes (last 24 hours) (Notes from 9/7/2017 11:08 AM through 9/8/2017 11:08 AM)      Jacques Younger MD at 9/7/2017 12:14 PM  Version 1 of 1         TOTAL KNEE ARTHROPLASTY  Procedure Note    Qininfa Gupta  9/7/2017    Pre-op Diagnosis: Left knee osteoarthritis    Post-op Diagnosis:     Post-Op Diagnosis Codes:     * Osteoarthritis of left knee [M17.9]    Procedure/CPT® Codes:  SD TOTAL KNEE ARTHROPLASTY [70288]    Procedure(s):  TOTAL KNEE ARTHROPLASTY LEFT     Surgeon(s):  Jacques Younger MD     Assistant: BHARAT Stevenson    Anesthesia: Spinal, local and adductor canal catheter    Staff:   Circulator: Halie Welch RN; Nico Mendez RN  Scrub Person: Evy Schmidt  Nursing Assistant: Tyler Bojorquez  Assistant: Rakan Sauer,  BHARAT  [place]: Mihaela Knowles    Estimated Blood Loss: *No blood loss documented*  Urine Voided: * No values recorded between 9/7/2017 10:11 AM and 9/7/2017 11:59 AM *    Specimens:                * No specimens in log *      Drains: None          Findings: Severe tricompartmental left knee degenerative changes    Complications: None    Tourniquet time: 70 minutes at 300 mmHg    Implants: Depuy Attune PS TKA size 5N femur, 3 tibia, 6 poly and 32 patella      Jacques Younger MD     Date: 9/7/2017  Time: 12:14 PM         Electronically signed by Jacques Younger MD at 9/7/2017 12:15 PM      Jacques Younger MD at 9/7/2017 12:15 PM  Version 1 of 1         Preoperative diagnosis: Left knee end stage osteoarthritis    Postoperative diagnosis: Left knee end stage osteoarthritis    Operative procedure: Left total knee arthroplasty    Surgeon: Dr. Isak Younger    Assistant: BHARAT Stevenson.  Necessary during the case for positioning of the patient, exposure, implantation of components and closure.    Anesthesia: Spinal with local and adductor canal catheter    Estimated blood loss: Minimal    Implants: Depuy Attune  posterior stabilized total knee arthroplasty size 5 narrow femur, 3 tibia, 32 patella, 5 poly    Tourniquet time: 70 minutes at 300 mmHg    Indications for procedure: Qi is a very pleasant 66-year-old female who has struggled with end-stage activity limiting right knee pain secondary to osteoarthritis.  X-rays this year revealed severe tricompartmental degenerative changes in a varus knee with complete loss of medial joint space and marginal osteophyte formation.  She has failed exhaustive conservative treatment measures including cortisone injections, viscosupplementation injections in June and physical therapy.  After undergoing a shared decision-making process they agreed to proceed with left total knee arthroplasty.  Surgical consent form was signed.    Description of procedure: The  patient was seen in the preoperative holding area and the left leg was signed to confirm the correct operative site.  They were seen by anesthesia.  They received Clindamycin 900mg IV prophylactic antibiotics within 1 hour of incision time.  They were brought back to the operating room.  Spinal was performed without difficulty and was given sedation throughout the case.  Patient placed in the supine position and all of  bony prominences were well-padded.  A bump was placed underneath the left hip.  Nonsterile tourniquet was applied to the thigh.  The left lower extremity was prepped and draped in the usual sterile fashion and timeout was performed to confirm left total knee arthroplasty for patient Qi Gupta.    The left lower extremity was exsanguinated with an Esmarch.  Tourniquet was inflated to 300 mmHg.  With the knee flexed a midline incision was made with a 10 blade scalpel.  Adequate hemostasis maintained with Bovie electrocautery.  Full-thickness medial and lateral flaps were elevated.  Using a fresh 10 blade scalpel I made a medial parapatellar arthrotomy.  The patella was everted.  Patella fat pad and anterior femoral fat pads were excised.  Marginal osteophytes were removed.  Medial release was performed for this varus knee using Bovie electrocautery.  [place]'s line was marked.  Z retractors were placed medially and laterally.  The distal femur was then drilled and intramedullary distal femoral cutting guide was pinned in place set at a 5° valgus cut for a 9 mm cut.  This cut was then made with the oscillating saw.  The femur was then sized to a size 5 set at 3° of external rotation.  4-in-1 cutting guide was pinned in place.  Anterior and posterior cuts were made as were the chamfer cuts.  Cut bone was removed.  We then turned our attention to the tibia.    The tibia was subluxed anteriorly. PCL retractor was placed as were medial and lateral Hohmann retractors.  We then used the extramedullary  tibial cutting guide set at 3° posterior slope for the proximal tibial cut.  5 mm of bone was removed from the low medial side and a centimeter from the high lateral side.  Medial and lateral menisci were then excised as were posterior osteophytes.  Flexion and extension gaps were then checked and with a 6 mm block we achieved full extension and flexion with excellent alignment. The tibia was sized to a 3 tibial tray centered off the medial third of the tibial tubercle.  The tray was pinned in place.  We then reamed the tibia and impacted the tibial fins which were left in place for trialing.  Size 5 cutting guide was then used to make the box femoral cut with the reciprocal saw.  The trial femur was then placed and held in place with pins. We trialed with a size 6 poly, 5 femur and 3 tibia.  With these trial components in place we achieved full extension and flexion with excellent alignment and stable throughout.  Lug holes for the femur were drilled.    We then turned our attention to the patella.  Patella was sized to 22 mm in thickness so I made a 9-1/2 mm patellar cut with the reciprocal saw.  A 32 trial was placed and the patella drill holes were made.  With the trial button in place there was excellent tracking with the no thumbs technique.    Trial components were then removed.  Final components were opened on the back table.  Posterior capsule was injected with 20 cc of half percent Ropivicaine and 5 mg of morphine.  Cement was mixed on the back table.  The knee was copiously irrigated with Pulsavac lavage.  The knee was thoroughly dried and a bone plug was placed in the distal femoral drill hole.  Cement was then applied to the tibia and final size 3 tibial tray was impacted in place and excess cement was removed.  Cement was applied to the femur and final size 5 narrow femur was impacted in place and excess cement removed.  We then placed a 6 mm poly-this was seen to be fully seated in the tibial tray.   Knee was then placed in extension and we applied cement to the cut patellar surface and 32 patella was held in place with patellar clamp.  All excess cement was removed.  The knee was left in extension while cement cured.    Once the cement was fully cured we irrigated the knee with Pulsavac lavage.  The knee was taken through full range of motion and seen to achieve full extension and flexion with excellent patellar tracking.    We then proceeded with closure.  The deep fascia was closed with a #1 Stratafix barbed suture.  Dermis was closed with 2-0 Vicryl.  Skin was closed with a running 3-0 Stratafix barbed subcuticular suture.  A sterile dressing was then applied with Pirineo mesh dressing and Dermabond.  We then applied 4 x 4's, ABDs, soft roll and a six-inch Ace bandage.    Tourniquet was deflated at 70 minutes.  Patient was transferred to recovery in stable condition.  All sponge and needle counts were correct ×2.  Patient received first dose of TXA just prior to incision and the second dose 5-10 minutes prior to letting down the tourniquet to minimize bleeding.    Postoperative plan:  Patient will be admitted to Dr. SAM for medical management  Mobilize starting today with PT/OT as tolerated  Start Aspirin for DVT prophylaxis tomorrow   consult for home physical therapy and home equipment needs  Follow-up with me in 2-3 weeks.    Jacques Younger MD  09/07/17  12:15 PM         Electronically signed by Jacques Younger MD at 9/7/2017 12:18 PM           Physical Therapy Notes (last 24 hours) (Notes from 9/7/2017 11:09 AM through 9/8/2017 11:09 AM)      Yenny Lujan, PT at 9/7/2017  5:31 PM  Version 1 of 1         Problem: Patient Care Overview (Adult)  Goal: Plan of Care Review  Outcome: Ongoing (interventions implemented as appropriate)    09/07/17 3700   Coping/Psychosocial Response Interventions   Plan Of Care Reviewed With patient;spouse;family   Patient Care Overview    Progress progress toward functional goals as expected   Outcome Evaluation   Outcome Summary/Follow up Plan Patient ambulated 150 feet with RW, limited by pain and nausea/vomiting. ROM to be initiated POD#1. Plan is d/c home with HHPT.          Problem: Knee Replacement, Total (Adult)  Goal: Signs and Symptoms of Listed Potential Problems Will be Absent or Manageable (Knee Replacement, Total)  Outcome: Ongoing (interventions implemented as appropriate)    09/07/17 1535   Knee Replacement, Total   Problems Assessed (Total Knee Replacement) pain;decreased range of motion;functional decline/self care deficit   Problems Present (Total Knee Replacement) pain;decreased range of motion;functional decline/self care deficit         Problem: Inpatient Physical Therapy  Goal: Bed Mobility Goal LTG- PT  Outcome: Ongoing (interventions implemented as appropriate)    09/07/17 1535   Bed Mobility PT LTG   Bed Mobility PT LTG, Date Established 09/07/17   Bed Mobility PT LTG, Time to Achieve 5 days   Bed Mobility PT LTG, Activity Type supine to sit/sit to supine   Bed Mobility PT LTG, [place] Level conditional independence   Bed Mobility PT LTG, Date Goal Reviewed 09/07/17   Bed Mobility PT LTG, Outcome goal ongoing       Goal: Transfer Training Goal 1 LTG- PT  Outcome: Ongoing (interventions implemented as appropriate)    09/07/17 1535   Transfer Training PT LTG   Transfer Training PT LTG, Date Established 09/07/17   Transfer Training PT LTG, Time to Achieve 5 days   Transfer Training PT LTG, Activity Type sit to stand/stand to sit   Transfer Training PT LTG, [place] Level conditional independence   Transfer Training PT LTG, Assist Device walker, rolling   Transfer Training PT LTG, Date Goal Reviewed 09/07/17   Transfer Training PT LTG, Outcome goal ongoing       Goal: Gait Training Goal LTG- PT  Outcome: Ongoing (interventions implemented as appropriate)    09/07/17 1535   Gait Training PT LTG   Gait Training Goal PT  LTG, Date Established 09/07/17   Gait Training Goal PT LTG, Time to Achieve 5 days   Gait Training Goal PT LTG, [place] Level conditional independence   Gait Training Goal PT LTG, Assist Device walker, rolling   Gait Training Goal PT LTG, Distance to Achieve 500 feet   Gait Training Goal PT LTG, Date Goal Reviewed 09/07/17   Gait Training Goal PT LTG, Outcome goal ongoing       Goal: Stair Training Goal LTG- PT  Outcome: Ongoing (interventions implemented as appropriate)    09/07/17 1635   Stair Training PT LTG   Stair Training Goal PT LTG, Date Established 09/07/17   Stair Training Goal PT LTG, Time to Achieve 5 days   Stair Training Goal PT LTG, Number of Steps 2   Stair Training Goal PT LTG, [place] Level contact guard assist   Stair Training Goal PT LTG, Assist Device cane, straight;other (see comments)  (HHA)   Stair Training Goal PT LTG, Date Goal Reviewed 09/07/17   Stair Training Goal PT LTG, Outcome goal ongoing       Goal: Range of Motion Goal LTG- PT  Outcome: Ongoing (interventions implemented as appropriate)    09/07/17 1535   Range of Motion PT LTG   Range of Motion Goal PT LTG, Date Established 09/07/17   Range of Motion Goal PT LTG, Time to Achieve 5 days   Range fo Motion Goal PT LTG, Joint L knee   Range of Motion Goal PT LTG, AAROM Measure 0-90 degrees   Range of Motion Goal PT LTG, Date Goal Reviewed 09/07/17   Range of Motion Goal PT LTG, Outcome goal ongoing              Electronically signed by Yenny Lujan, PT at 9/7/2017  5:31 PM      Yenny Lujan, PT at 9/7/2017  5:32 PM  Version 1 of 1         Problem: Inpatient Physical Therapy  Goal: Stair Training Goal LTG- PT  Outcome: Ongoing (interventions implemented as appropriate)    09/07/17 1535   Stair Training PT LTG   Stair Training Goal PT LTG, Date Established 09/07/17   Stair Training Goal PT LTG, Time to Achieve 5 days   Stair Training Goal PT LTG, Number of Steps 1   Stair Training Goal PT LTG, [place]  Level conditional independence   Stair Training Goal PT LTG, Assist Device walker, rolling;other (see comments)  (backwards)   Stair Training Goal PT LTG, Date Goal Reviewed 17   Stair Training Goal PT LTG, Outcome goal ongoing              Electronically signed by Yenny Lujan, PT at 2017  5:32 PM      Yenny Lujan, PT at 2017  5:33 PM  Version 1 of 1         Acute Care - Physical Therapy Initial Evaluation  [facility]     Patient Name: Qi Gupta  : 1950  MRN: [id number]  Today's Date: 2017   Onset of Illness/Injury or Date of Surgery Date: 17  Date of Referral to PT: 17  Referring Physician: MD Aren      Admit Date: 2017     Visit Dx:    ICD-10-CM ICD-9-CM   1. Impaired functional mobility, balance, gait, and endurance Z74.09 V49.89     Patient Active Problem List   Diagnosis   • Osteoarthritis of left knee   • Status post total left knee replacement   • Prediabetes   • GERD (gastroesophageal reflux disease)     Past Medical History:   Diagnosis Date   • Acid reflux    • Arthritis    • Diverticulosis    • Hypertension     Borderline   • Knee pain, left    • Mitral valve prolapse    • Wears glasses      Past Surgical History:   Procedure Laterality Date   • BIOPSY SKIN / SQ / MUCOUS MEMBRANE      MOLE- UPPER RIGHT LEG   • COLONOSCOPY     • LAPAROSCOPIC TUBAL LIGATION            PT ASSESSMENT (last 72 hours)      PT Evaluation       17 1535 17 0812    Rehab Evaluation    Document Type evaluation  -LR     Subjective Information agree to therapy;complains of;pain;numbness  -LR     Patient Effort, Rehab Treatment good  -LR     Symptoms Noted During/After Treatment increased pain;other (see comments)   nausea and vomiting.   -LR     Symptoms Noted Comment Notified RN.   -LR     General Information    Patient Profile Review yes  -LR     Onset of Illness/Injury or Date of Surgery Date 17  -LR     Referring Physician MD Aren  -LR      General Observations Patient supine in bed upon arrival with  and family present. IV, L adductor canal nerve catheter, L knee ace bandaged, SCDs.   -LR     Pertinent History Of Current Problem Patient presents for surgical management of persistent and progressive L knee pain and dysfunction that has failed to improve with conservative management.   -LR     Precautions/Limitations fall precautions;other (see comments)   L adductor canal nerve catheter  -LR     Prior Level of Function min assist:;all household mobility;gait;transfer;bed mobility;dependent:;community mobility;home management;cooking;cleaning;shopping;driving   all mobility limited by pain;no driving,no home management.   -LR     Equipment Currently Used at Home cane, straight;cane, quad;grab bar;bath bench   used SPC occasionally  -LR cane, straight;other (see comments)  -NM    Plans/Goals Discussed With patient;spouse/S.O.;family;agreed upon  -LR     Risks Reviewed patient:;spouse/S.O.:;family:;LOB;nausea/vomiting;dizziness;increased discomfort  -LR     Benefits Reviewed patient:;spouse/S.O.:;family:;improve function;increase independence;increase strength;increase balance;decrease pain;increase knowledge  -LR     Barriers to Rehab previous functional deficit  -LR     Living Environment    Lives With spouse  -LR spouse  -NM    Living Arrangements house  -LR house  -NM    Home Accessibility bed and bath on same level;grab bars present (toilet);house is not wheelchair accessible;stairs to enter home;stairs within home;tub/shower is not walk in   will be staying on first floor  -LR     Number of Stairs to Enter Home --   2 steps onto porch, then one into home, no handrails  -LR     Number of Stairs Within Home 1   into sunroom  -LR     Stair Railings at Home none  -LR     Type of Financial/Environmental Concern none  -LR     Transportation Available family or friend will provide  -LR family or friend will provide  -NM    Living Environment Comment  Patient reports her  will be available to assist at all times.   -LR     Clinical Impression    Date of Referral to PT 09/07/17  -LR     PT Diagnosis s/p elective L TKA  -LR     Prognosis good  -LR     Patient/Family Goals Statement go home, decrease pain  -LR     Criteria for Skilled Therapeutic Interventions Met yes;treatment indicated  -LR     Rehab Potential good, to achieve stated therapy goals  -LR     Pain Assessment    Pain Assessment 0-10  -LR     Pain Score 7  -LR     Post Pain Score 10  -LR     Pain Type Acute pain  -LR     Pain Location Knee  -LR     Pain Orientation Left;Anterior;Outer  -LR     Pain Intervention(s) Repositioned;Ambulation/increased activity;Medication (See MAR)  -LR     Vision Assessment/Intervention    Visual Impairment WFL  -LR     Cognitive Assessment/Intervention    Current Cognitive/Communication Assessment functional  -LR     Orientation Status oriented x 4;required verbal cueing (specifiy in comments)  -LR     Follows Commands/Answers Questions 100% of the time;able to follow single-step instructions;needs cueing  -LR     Personal Safety WNL/WFL  -LR     ROM (Range of Motion)    General ROM lower extremity range of motion deficits identified  -LR     General LE Assessment    ROM RLE ROM was WFL;knee, left: LE ROM deficit  -LR     ROM Detail L knee AROM impaired 25%  -LR     MMT (Manual Muscle Testing)    General MMT Assessment lower extremity strength deficits identified  -LR     Lower Extremity    Lower Ext Manual Muscle Testing right LE strength is WFL;left knee strength deficit  -LR     Lower Ext Manual Muscle Testing Detail L knee functionally 4-/5  -LR     Mobility Assessment/Training    Extremity Weight-Bearing Status left lower extremity  -LR     Left Lower Extremity Weight-Bearing weight-bearing as tolerated  -LR     Bed Mobility, Assessment/Treatment    Bed Mobility, Assistive Device head of bed elevated;bed rails  -LR     Bed Mob, Supine to Sit, [place]  verbal cues required;minimum assist (75% patient effort)  -LR     Bed Mob, Sit to Supine, [place] not tested   UIC at end of eval.   -LR     Bed Mobility, Safety Issues decreased use of legs for bridging/pushing  -LR     Bed Mobility, Impairments ROM decreased;strength decreased;pain  -LR     Bed Mobility, Comment Verbal cues to move LEs towards EOB and to push up from bed to raise trunk into sitting and to scoot hips out to get feet on floor. Min assist required to move L LE. Pt c/o nausea upon sitting up and proceeded to vomit in basin. Notified RN.   -LR     Transfer Assessment/Treatment    Transfers, Sit-Stand [place] verbal cues required;contact guard assist;2 person assist required  -LR     Transfers, Stand-Sit [place] verbal cues required;contact guard assist;2 person assist required  -LR     Transfers, Sit-Stand-Sit, Assist Device rolling walker  -LR     Transfer, Safety Issues sequencing ability decreased;step length decreased;weight-shifting ability decreased  -LR     Transfer, Impairments ROM decreased;strength decreased;pain  -LR     Transfer, Comment Verbal cues to push up from bed to stand and to reach back for chair to lower into sitting. Verbal cues to step L LE out before t/f.   -LR     Gait Assessment/Treatment    Gait, [place] Level verbal cues required;contact guard assist;2 person assist required  -LR     Gait, Assistive Device rolling walker  -LR     Gait, Distance (Feet) 150  -LR     Gait, Gait Pattern Analysis swing-through gait  -LR     Gait, Gait Deviations left:;antalgic;decreased heel strike;forward flexed posture;step length decreased;toe-to-floor clearance decreased;weight-shifting ability decreased  -LR     Gait, Safety Issues sequencing ability decreased;step length decreased;weight-shifting ability decreased  -LR     Gait, Impairments ROM decreased;strength decreased;pain  -LR     Gait, Comment Patient initiated gait with step to gait pattern at slow pace. Verbal  cues for increased L LE weight bearing/stance phase and decreased UE weight bearing. Improved with cues for correction, able to progress to step through gait pattern. Gait limited by pain/fatigue.   -LR     Therapy Exercises    Exercise Protocols total knee  -LR     Total Knee Exercises left:;10 reps;ankle pumps/circles;quad set;glut set   cues for technique  -LR     Sensory Assessment/Intervention    Sensory Impairment --   c/o mild numbness; able to actively DF bilaterally  -LR     Positioning and Restraints    Pre-Treatment Position in bed  -LR     Post Treatment Position chair  -LR     In Chair notified nsg;reclined;sitting;call light within reach;encouraged to call for assist;exit alarm on;legs elevated;compression device;with family/caregiver  -LR       User Key  (r) = Recorded By, (t) = Taken By, (c) = Cosigned By    Initials Name Provider Type    LR Yenny Lujan, PT Physical Therapist    NM Paige Fair, RN Registered Nurse          Physical Therapy Education     Title: PT OT SLP Therapies (Done)     Topic: Physical Therapy (Done)     Point: Mobility training (Done)    Learning Progress Summary    Learner Readiness Method Response Comment Documented by Status   Patient Acceptance D,E VU,NR Educated on weight bearing status and correct gait mechanics.  09/07/17 1728 Done               Point: Home exercise program (Done)    Learning Progress Summary    Learner Readiness Method Response Comment Documented by Status   Patient Acceptance D,E VU,NR Educated on weight bearing status and correct gait mechanics.  09/07/17 1728 Done               Point: Body mechanics (Done)    Learning Progress Summary    Learner Readiness Method Response Comment Documented by Status   Patient Acceptance D,E VU,NR Educated on weight bearing status and correct gait mechanics.  09/07/17 1728 Done               Point: Precautions (Done)    Learning Progress Summary    Learner Readiness Method Response Comment Documented  by Status   Patient Acceptance D,E JENIFFER,NR Educated on weight bearing status and correct gait mechanics. LR 09/07/17 1728 Done                      User Key     Initials Effective Dates Name Provider Type Discipline    LR 06/19/15 -  Yenny Lujan, PT Physical Therapist PT                PT Recommendation and Plan  Anticipated Equipment Needs At Discharge: front wheeled walker  Anticipated Discharge Disposition: home with assist, home with home health  Planned Therapy Interventions: balance training, bed mobility training, gait training, home exercise program, patient/family education, ROM (Range of Motion), stair training, strengthening, transfer training  PT Frequency: 2 times/day  Plan of Care Review  Plan Of Care Reviewed With: patient, spouse, family  Progress: progress toward functional goals as expected  Outcome Summary/Follow up Plan: Patient ambulated 150 feet with RW, limited by pain and nausea/vomiting. ROM to be initiated POD#1. Plan is d/c home with HHPT.           IP PT Goals       09/07/17 1635 09/07/17 1535       Bed Mobility PT LTG    Bed Mobility PT LTG, Date Established  09/07/17  -LR     Bed Mobility PT LTG, Time to Achieve  5 days  -LR     Bed Mobility PT LTG, Activity Type  supine to sit/sit to supine  -LR     Bed Mobility PT LTG, Center Level  conditional independence  -LR     Bed Mobility PT LTG, Date Goal Reviewed  09/07/17  -LR     Bed Mobility PT LTG, Outcome  goal ongoing  -LR     Transfer Training PT LTG    Transfer Training PT LTG, Date Established  09/07/17  -LR     Transfer Training PT LTG, Time to Achieve  5 days  -LR     Transfer Training PT LTG, Activity Type  sit to stand/stand to sit  -LR     Transfer Training PT LTG, Center Level  conditional independence  -LR     Transfer Training PT LTG, Assist Device  walker, rolling  -LR     Transfer Training PT  LTG, Date Goal Reviewed  09/07/17  -LR     Transfer Training PT LTG, Outcome  goal ongoing  -LR     Gait  Training PT LTG    Gait Training Goal PT LTG, Date Established  09/07/17  -LR     Gait Training Goal PT LTG, Time to Achieve  5 days  -LR     Gait Training Goal PT LTG, [place] Level  conditional independence  -LR     Gait Training Goal PT LTG, Assist Device  walker, rolling  -LR     Gait Training Goal PT LTG, Distance to Achieve  500 feet  -LR     Gait Training Goal PT LTG, Date Goal Reviewed  09/07/17  -LR     Gait Training Goal PT LTG, Outcome  goal ongoing  -LR     Stair Training PT LTG    Stair Training Goal PT LTG, Date Established 09/07/17  -LR 09/07/17  -LR     Stair Training Goal PT LTG, Time to Achieve 5 days  -LR 5 days  -LR     Stair Training Goal PT LTG, Number of Steps 2  -LR 1  -LR     Stair Training Goal PT LTG, [place] Level contact guard assist  -LR conditional independence  -LR     Stair Training Goal PT LTG, Assist Device cane, straight;other (see comments)   HHA  -LR walker, rolling;other (see comments)   backwards  -LR     Stair Training Goal PT LTG, Date Goal Reviewed 09/07/17  -LR 09/07/17  -LR     Stair Training Goal PT LTG, Outcome goal ongoing  -LR goal ongoing  -LR     Range of Motion PT LTG    Range of Motion Goal PT LTG, Date Established  09/07/17  -LR     Range of Motion Goal PT LTG, Time to Achieve  5 days  -LR     Range fo Motion Goal PT LTG, Joint  L knee  -LR     Range of Motion Goal PT LTG, AAROM Measure  0-90 degrees  -LR     Range of Motion Goal PT LTG, Date Goal Reviewed  09/07/17  -LR     Range of Motion Goal PT LTG, Outcome  goal ongoing  -LR       User Key  (r) = Recorded By, (t) = Taken By, (c) = Cosigned By    Initials Name Provider Type    LR Yenny Lujan, PT Physical Therapist                Outcome Measures       09/07/17 1535          How much help from another person do you currently need...    Turning from your back to your side while in flat bed without using bedrails? 3  -LR      Moving from lying on back to sitting on the side of a flat bed  without bedrails? 3  -LR      Moving to and from a bed to a chair (including a wheelchair)? 3  -LR      Standing up from a chair using your arms (e.g., wheelchair, bedside chair)? 3  -LR      Climbing 3-5 steps with a railing? 2  -LR      To walk in hospital room? 3  -LR      AM-PAC 6 Clicks Score 17  -LR      Functional Assessment    Outcome Measure Options AM-PAC 6 Clicks Basic Mobility (PT)  -LR        User Key  (r) = Recorded By, (t) = Taken By, (c) = Cosigned By    Initials Name Provider Type    LR Yenny Lujan, PT Physical Therapist           Time Calculation:         PT Charges       09/07/17 1732          Time Calculation    Start Time 1535  -LR      PT Received On 09/07/17  -LR      PT Goal Re-Cert Due Date 09/17/17  -LR      Time Calculation- PT    Total Timed Code Minutes- PT 15 minute(s)  -LR        User Key  (r) = Recorded By, (t) = Taken By, (c) = Cosigned By    Initials Name Provider Type    LR Yenny Lujan, PT Physical Therapist          Therapy Charges for Today     Code Description Service Date Service Provider Modifiers Qty    08231670514 HC GAIT TRAINING EA 15 MIN 9/7/2017 Yenny Lujan, PT GP 1    89420030973 HC PT THER SUPP EA 15 MIN 9/7/2017 Yenny Lujan, PT GP 3    41081340186 HC PT EVAL MOD COMPLEXITY 3 9/7/2017 Yenny Lujan, PT GP 1          PT G-Codes  Outcome Measure Options: AM-PAC 6 Clicks Basic Mobility (PT)      Yenny Lujan, PT  9/7/2017            Electronically signed by Yenny Lujan PT at 9/7/2017  5:33 PM

## 2017-09-08 NOTE — PLAN OF CARE
Problem: Patient Care Overview (Adult)  Goal: Plan of Care Review  Outcome: Ongoing (interventions implemented as appropriate)    09/08/17 1323   Coping/Psychosocial Response Interventions   Plan Of Care Reviewed With patient   Outcome Evaluation   Outcome Summary/Follow up Plan OT eval complete. S/P LTKR. Pt educated in use of AE for LBD and pt gave return demo to don/doff socks given min a. Pt CGA with bed mobility and transfer. Pt will benefit from OT to advance pt toward increased independence with ADLs .

## 2017-09-08 NOTE — THERAPY TREATMENT NOTE
Acute Care - Physical Therapy Treatment Note  [facility]     Patient Name: Qi Gupta  : 1950  MRN: [id number]  Today's Date: 2017  Onset of Illness/Injury or Date of Surgery Date: 17  Date of Referral to PT: 17  Referring Physician: MD Aren    Admit Date: 2017    Visit Dx:    ICD-10-CM ICD-9-CM   1. Impaired functional mobility, balance, gait, and endurance Z74.09 V49.89   2. Status post total left knee replacement Z96.652 V43.65   3. Impaired mobility and ADLs Z74.09 799.89     Patient Active Problem List   Diagnosis   • Osteoarthritis of left knee   • Status post total left knee replacement   • Prediabetes   • GERD (gastroesophageal reflux disease)               Adult Rehabilitation Note       17 1042 17 1036       Rehab Assessment/Intervention    Discipline  physical therapist  -MJ     Document Type  therapy note (daily note)  -MJ     Subjective Information  agree to therapy;complains of;pain  -MJ     Patient Effort, Rehab Treatment  good  -MJ     Symptoms Noted During/After Treatment  none  -MJ     Precautions/Limitations  fall precautions;other (see comments)   L adductor nerve catheter  -MJ     Recorded by  [MJ] Bonifacio Linares, ANA     Pain Assessment    Pain Assessment  0-10  -MJ     Pain Score  6  -MJ     Post Pain Score  6  -MJ     Pain Type  Acute pain  -MJ     Pain Location  Knee  -MJ     Pain Orientation  Left;Anterior;Outer  -MJ     Pain Intervention(s)  Repositioned;Ambulation/increased activity  -MJ     Recorded by  [MJ] Bonifacio Linares, ANA     Cognitive Assessment/Intervention    Current Cognitive/Communication Assessment  functional  -MJ     Orientation Status  oriented x 4  -MJ     Follows Commands/Answers Questions  100% of the time;able to follow multi-step instructions;needs cueing  -MJ     Personal Safety  mild impairment;impulsive  -MJ     Recorded by  [MJ] Bonifacio Linares, ANA     Mobility Assessment/Training    Extremity Weight-Bearing Status  left lower  extremity  -MJ     Left Lower Extremity Weight-Bearing  weight-bearing as tolerated  -MJ     Recorded by  [MJ] Bonifacio Linares, PT     Bed Mobility, Assessment/Treatment    Bed Mobility, Assistive Device  bed rails;head of bed elevated;leg   -MJ     Bed Mob, Supine to Sit, [place]  contact guard assist;verbal cues required  -MJ     Bed Mob, Sit to Supine, [place]  not tested   Pt UIC  -MJ     Bed Mobility, Safety Issues  decreased use of legs for bridging/pushing  -MJ     Bed Mobility, Impairments  ROM decreased;strength decreased;pain  -MJ     Bed Mobility, Comment  Verbal cues for sequencing with leg , increased time and effort to perform  -MJ     Recorded by  [MJ] Bonifacio Linares, PT     Transfer Assessment/Treatment    Transfers, Sit-Stand [place]  contact guard assist;verbal cues required  -MJ     Transfers, Stand-Sit [place]  contact guard assist;verbal cues required  -MJ     Transfers, Sit-Stand-Sit, Assist Device  rolling walker  -MJ     Transfer, Safety Issues  sequencing ability decreased;step length decreased;weight-shifting ability decreased  -MJ     Transfer, Impairments  ROM decreased;strength decreased;pain  -MJ     Transfer, Comment  Verbal cues for correct hand placement and to step L LE out prior to t/f. Pt stood before gait belt donned and RW in front of her, cues for safety  -MJ     Recorded by  [MJ] Bonifacio Linares, PT     Gait Assessment/Treatment    Gait, [place] Level  contact guard assist;verbal cues required  -MJ     Gait, Assistive Device  rolling walker  -MJ     Gait, Distance (Feet)  250  -MJ     Gait, Gait Pattern Analysis  swing-through gait  -MJ     Gait, Gait Deviations  left:;antalgic;kristen decreased;step length decreased;weight-shifting ability decreased  -MJ     Gait, Safety Issues  step length decreased;weight-shifting ability decreased  -MJ     Gait, Impairments  ROM decreased;strength decreased;pain  -MJ     Gait, Comment  Pt demo step through gait  pattern at slow pace. Verbal cues to increase LE weight bearing, improvement noted. Pt exhibits good heel strike and sequencing with RW without cues. Gait limited by pain and fatigue  -     Recorded by  [MJ] Bonifacio Linares PT     Functional Mobility    Functional Mobility- Device rolling walker  -      Functional Mobility-Distance (Feet) 250  -      Recorded by [AC] Sugar Flores OT      Lower Body Dressing Assessment/Training    LB Dressing Assess/Train, Clothing Type doffing:;donning:;slipper socks  -      LB Dressing Assess/Train, Assist Device reacher;sock-aid  -      LB Dressing Assess/Train, Position sitting  -      LB Dressing Assess/Train, [place] verbal cues required;minimum assist (75% patient effort)  -      Recorded by [AC] Sugar Flores OT      Therapy Exercises    Exercise Protocols  total knee  -     Total Knee Exercises  left:;15 reps;completed protocol;with assist;ankle pumps/circles;quad set;glut set;heel slide stretch;SLR;SAQ;hip abduction/adduction;LAQ   Cues for technique. Ady w/ SLR, hip abd, SAQ  -     Recorded by  [MJ] Bonifacio Linares PT     Positioning and Restraints    Pre-Treatment Position in bed  -AC in bed  -     Post Treatment Position chair  - chair  -MJ     In Chair notified nsg;reclined;call light within reach;encouraged to call for assist  - notified nsg;reclined;call light within reach;encouraged to call for assist  -     Recorded by [AC] Sugar Flores OT [MJ] Bonifacio Linares PT       User Key  (r) = Recorded By, (t) = Taken By, (c) = Cosigned By    Initials Name Effective Dates     Sugar Flores OT 06/23/15 -     MJ Bonifacio Linares PT 03/14/16 -                 IP PT Goals       09/08/17 1317 09/07/17 1635 09/07/17 1535    Bed Mobility PT LTG    Bed Mobility PT LTG, Date Established   09/07/17  -LR    Bed Mobility PT LTG, Time to Achieve   5 days  -LR    Bed Mobility PT LTG, Activity Type   supine to sit/sit to supine  -LR    Bed Mobility PT LTG,  [place] Level   conditional independence  -LR    Bed Mobility PT LTG, Date Goal Reviewed 09/08/17  -MJ  09/07/17  -LR    Bed Mobility PT LTG, Outcome goal ongoing  -  goal ongoing  -LR    Transfer Training PT LTG    Transfer Training PT LTG, Date Established   09/07/17  -LR    Transfer Training PT LTG, Time to Achieve   5 days  -LR    Transfer Training PT LTG, Activity Type   sit to stand/stand to sit  -LR    Transfer Training PT LTG, [place] Level   conditional independence  -LR    Transfer Training PT LTG, Assist Device   walker, rolling  -LR    Transfer Training PT  LTG, Date Goal Reviewed 09/08/17  -MJ  09/07/17  -LR    Transfer Training PT LTG, Outcome goal ongoing  -  goal ongoing  -LR    Gait Training PT LTG    Gait Training Goal PT LTG, Date Established   09/07/17  -LR    Gait Training Goal PT LTG, Time to Achieve   5 days  -LR    Gait Training Goal PT LTG, [place] Level   conditional independence  -LR    Gait Training Goal PT LTG, Assist Device   walker, rolling  -LR    Gait Training Goal PT LTG, Distance to Achieve   500 feet  -LR    Gait Training Goal PT LTG, Date Goal Reviewed 09/08/17  -MJ  09/07/17  -LR    Gait Training Goal PT LTG, Outcome goal ongoing  -  goal ongoing  -LR    Stair Training PT LTG    Stair Training Goal PT LTG, Date Established  09/07/17  -LR 09/07/17  -LR    Stair Training Goal PT LTG, Time to Achieve  5 days  -LR 5 days  -LR    Stair Training Goal PT LTG, Number of Steps  2  -LR 1  -LR    Stair Training Goal PT LTG, [place] Level  contact guard assist  -LR conditional independence  -LR    Stair Training Goal PT LTG, Assist Device  cane, straight;other (see comments)   HHA  -LR walker, rolling;other (see comments)   backwards  -LR    Stair Training Goal PT LTG, Date Goal Reviewed 09/08/17  -MJ 09/07/17  -LR 09/07/17  -LR    Stair Training Goal PT LTG, Outcome goal ongoing  -MJ goal ongoing  -LR goal ongoing  -LR    Range of Motion PT LTG    Range of Motion  Goal PT LTG, Date Established   09/07/17  -LR    Range of Motion Goal PT LTG, Time to Achieve   5 days  -LR    Range fo Motion Goal PT LTG, Joint   L knee  -LR    Range of Motion Goal PT LTG, AAROM Measure   0-90 degrees  -LR    Range of Motion Goal PT LTG, Date Goal Reviewed 09/08/17  -MJ  09/07/17  -LR    Range of Motion Goal PT LTG, Outcome goal ongoing  -MJ  goal ongoing  -LR      User Key  (r) = Recorded By, (t) = Taken By, (c) = Cosigned By    Initials Name Provider Type    LR Yenny Lujan, PT Physical Therapist    MJ Bonifacio Linares, PT Physical Therapist          Physical Therapy Education     Title: PT OT SLP Therapies (Active)     Topic: Physical Therapy (Done)     Point: Mobility training (Done)    Learning Progress Summary    Learner Readiness Method Response Comment Documented by Status   Patient Acceptance E,D NR,VU Reviewed gait mechanics, [facility] 09/08/17 1316 Done    Acceptance D,E VU,NR Educated on weight bearing status and correct gait mechanics.  09/07/17 1728 Done               Point: Home exercise program (Done)    Learning Progress Summary    Learner Readiness Method Response Comment Documented by Status   Patient Acceptance E,D NR,VU Reviewed gait mechanics, [facility] 09/08/17 1316 Done    Acceptance D,E VU,NR Educated on weight bearing status and correct gait mechanics.  09/07/17 1728 Done               Point: Body mechanics (Done)    Learning Progress Summary    Learner Readiness Method Response Comment Documented by Status   Patient Acceptance E,D NR,VU Reviewed gait mechanics, [facility] 09/08/17 1316 Done    Acceptance D,E VU,NR Educated on weight bearing status and correct gait mechanics.  09/07/17 1728 Done               Point: Precautions (Done)    Learning Progress Summary    Learner Readiness Method Response Comment Documented by Status   Patient Acceptance E,D NR,VU Reviewed gait mechanics, [facility] 09/08/17 1316 Done    Acceptance D,E VU,NR Educated on weight bearing status and  correct gait mechanics. LR 09/07/17 1728 Done                      User Key     Initials Effective Dates Name Provider Type Discipline    LR 06/19/15 -  Yenny Lujan, PT Physical Therapist PT     03/14/16 -  Bonifacio Linares, PT Physical Therapist PT                    PT Recommendation and Plan  Anticipated Equipment Needs At Discharge: front wheeled walker  Anticipated Discharge Disposition: home with assist, home with home health  Planned Therapy Interventions: balance training, bed mobility training, gait training, home exercise program, patient/family education, ROM (Range of Motion), stair training, strengthening, transfer training  PT Frequency: 2 times/day  Plan of Care Review  Plan Of Care Reviewed With: patient  Progress: improving  Outcome Summary/Follow up Plan: Pt increased gait distance to 250 feet with CGA and RW. Will obtain ROM measure and progress to stair training in PM          Outcome Measures       09/08/17 1036 09/07/17 1535       How much help from another person do you currently need...    Turning from your back to your side while in flat bed without using bedrails? 3  -MJ 3  -LR     Moving from lying on back to sitting on the side of a flat bed without bedrails? 3  -MJ 3  -LR     Moving to and from a bed to a chair (including a wheelchair)? 3  -MJ 3  -LR     Standing up from a chair using your arms (e.g., wheelchair, bedside chair)? 3  -MJ 3  -LR     Climbing 3-5 steps with a railing? 3  -MJ 2  -LR     To walk in hospital room? 3  -MJ 3  -LR     AM-PAC 6 Clicks Score 18  -MJ 17  -LR     Functional Assessment    Outcome Measure Options AM-PAC 6 Clicks Basic Mobility (PT)  -MJ AM-PAC 6 Clicks Basic Mobility (PT)  -LR       User Key  (r) = Recorded By, (t) = Taken By, (c) = Cosigned By    Initials Name Provider Type    LR Yenny Lujan, PT Physical Therapist    LANE Linares, PT Physical Therapist           Time Calculation:         PT Charges       09/08/17 1319          Time  Calculation    Start Time 1036  -MJ      PT Received On 09/08/17  -MJ      PT Goal Re-Cert Due Date 09/17/17  -MJ      Time Calculation- PT    Total Timed Code Minutes- PT 25 minute(s)  -        User Key  (r) = Recorded By, (t) = Taken By, (c) = Cosigned By    Initials Name Provider Type    LANE Linares PT Physical Therapist          Therapy Charges for Today     Code Description Service Date Service Provider Modifiers Qty    95801532616 HC GAIT TRAINING EA 15 MIN 9/8/2017 Bonifacio Linares, PT GP 1    83064514798 HC PT THER PROC EA 15 MIN 9/8/2017 Bonifacio Linares, PT GP 1          PT G-Codes  Outcome Measure Options: AM-PAC 6 Clicks Basic Mobility (PT)    Bonifacio Linares PT  9/8/2017

## 2017-09-08 NOTE — THERAPY EVALUATION
Acute Care - Occupational Therapy Initial Evaluation  [facility]     Patient Name: Qi Gupta  : 1950  MRN: [id number]  Today's Date: 2017  Onset of Illness/Injury or Date of Surgery Date: 17  Date of Referral to OT: 17  Referring Physician: MD Aren    Admit Date: 2017       ICD-10-CM ICD-9-CM   1. Impaired functional mobility, balance, gait, and endurance Z74.09 V49.89   2. Status post total left knee replacement Z96.652 V43.65   3. Impaired mobility and ADLs Z74.09 799.89     Patient Active Problem List   Diagnosis   • Osteoarthritis of left knee   • Status post total left knee replacement   • Prediabetes   • GERD (gastroesophageal reflux disease)     Past Medical History:   Diagnosis Date   • Acid reflux    • Arthritis    • Diverticulosis    • Hypertension     Borderline   • Knee pain, left    • Mitral valve prolapse    • Wears glasses      Past Surgical History:   Procedure Laterality Date   • BIOPSY SKIN / SQ / MUCOUS MEMBRANE      MOLE- UPPER RIGHT LEG   • COLONOSCOPY     • LAPAROSCOPIC TUBAL LIGATION     • GA TOTAL KNEE ARTHROPLASTY Left 2017    Procedure: TOTAL KNEE ARTHROPLASTY LEFT ;  Surgeon: Jacques Younger MD;  Location: [facility];  Service: Orthopedics          OT ASSESSMENT FLOWSHEET (last 72 hours)      OT Evaluation       17 1054 17 1042 17 1036 17 1535 17 0812    Rehab Evaluation    Document Type  evaluation  -AC therapy note (daily note)  -MJ evaluation  -LR     Subjective Information  agree to therapy;complains of;pain  -AC agree to therapy;complains of;pain  -MJ agree to therapy;complains of;pain;numbness  -LR     Patient Effort, Rehab Treatment  good  -AC good  -MJ good  -LR     Symptoms Noted During/After Treatment   none  -MJ increased pain;other (see comments)   nausea and vomiting.   -LR     Symptoms Noted Comment    Notified RN.   -LR     General Information    Patient Profile Review  yes  -AC  yes  -LR     Onset of  Illness/Injury or Date of Surgery Date  09/07/17  -  09/07/17  -LR     Referring Physician  MD Aren  -  MD Aren  -LR     General Observations  Pt received supine in bed.   -  Patient supine in bed upon arrival with  and family present. IV, L adductor canal nerve catheter, L knee ace bandaged, SCDs.   -LR     Pertinent History Of Current Problem  S/P L TKR  -  Patient presents for surgical management of persistent and progressive L knee pain and dysfunction that has failed to improve with conservative management.   -LR     Precautions/Limitations  fall precautions   nerve cath  -AC fall precautions;other (see comments)   L adductor nerve catheter  -MJ fall precautions;other (see comments)   L adductor canal nerve catheter  -LR     Prior Level of Function  --   extended time and increased pain with self care  -  min assist:;all household mobility;gait;transfer;bed mobility;dependent:;community mobility;home management;cooking;cleaning;shopping;driving   all mobility limited by pain;no driving,no home management.   -LR     Equipment Currently Used at Home cane, quad;bath bench  - bath bench;cane, quad;grab bar;cane, straight  -  cane, straight;cane, quad;grab bar;bath bench   used SPC occasionally  - cane, straight;other (see comments)  -NM    Plans/Goals Discussed With  patient;agreed upon  -  patient;spouse/S.O.;family;agreed upon  -LR     Risks Reviewed  patient:;LOB  -  patient:;spouse/S.O.:;family:;LOB;nausea/vomiting;dizziness;increased discomfort  -LR     Benefits Reviewed  spouse/S.O.:;improve function;increase independence;increase balance;increase knowledge  -  patient:;spouse/S.O.:;family:;improve function;increase independence;increase strength;increase balance;decrease pain;increase knowledge  -LR     Barriers to Rehab    previous functional deficit  -LR     Living Environment    Lives With spouse  - spouse  -AC  spouse  -LR spouse  -NM    Living Arrangements house  -[facility]   -AC  house  -LR house  -NM    Home Accessibility  bed and bath on same level;tub/shower is not walk in;stairs to enter home;stairs within home  -AC  bed and bath on same level;grab bars present (toilet);house is not wheelchair accessible;stairs to enter home;stairs within home;tub/shower is not walk in   will be staying on first floor  -LR     Number of Stairs to Enter Home  3  -AC  --   2 steps onto porch, then one into home, no handrails  -LR     Number of Stairs Within Home  14   does not have to go to 2nd floor  -AC  1   into sunroom  -LR     Stair Railings at Home    none  -LR     Type of Financial/Environmental Concern    none  -LR     Transportation Available car;family or friend will provide  -   family or friend will provide  -LR family or friend will provide  -NM    Living Environment Comment    Patient reports her  will be available to assist at all times.   -LR     Clinical Impression    Date of Referral to OT  09/07/17  -AC       OT Diagnosis  ADL decline  -AC       Patient/Family Goals Statement  Pt would like to increase indepndence with self care  -AC       Criteria for Skilled Therapeutic Interventions Met  yes;treatment indicated  -AC       Rehab Potential  good, to achieve stated therapy goals  -AC       Therapy Frequency  daily  -AC       Anticipated Equipment Needs At Discharge  --   self care kit for LBD  -AC       Anticipated Discharge Disposition  home with assist;home with home health  -AC       Functional Level Prior    Ambulation 1-->assistive equipment  -MH    1-->assistive equipment  -NM    Transferring 0-->independent  -MH    0-->independent  -NM    Toileting 0-->independent  -MH    0-->independent  -NM    Bathing 0-->independent  -MH    0-->independent  -NM    Dressing 0-->independent  -MH    0-->independent  -NM    Eating 0-->independent  -MH    0-->independent  -NM    Communication 0-->understands/communicates without difficulty  -MH    0-->understands/communicates without  difficulty  -NM    Swallowing 0-->swallows foods/liquids without difficulty  -MH    0-->swallows foods/liquids without difficulty  -NM    Pain Assessment    Pain Assessment  0-10  -AC 0-10  -MJ 0-10  -LR     Pain Score  6  -AC 6  -MJ 7  -LR     Post Pain Score  6  -AC 6  -MJ 10  -LR     Pain Type  Acute pain  -AC Acute pain  -MJ Acute pain  -LR     Pain Location  Knee  -AC Knee  -MJ Knee  -LR     Pain Orientation  Left;Anterior  -AC Left;Anterior;Outer  -MJ Left;Anterior;Outer  -LR     Pain Intervention(s)  Repositioned  -AC Repositioned;Ambulation/increased activity  -MJ Repositioned;Ambulation/increased activity;Medication (See MAR)  -LR     Vision Assessment/Intervention    Visual Impairment    WFL  -LR     Cognitive Assessment/Intervention    Current Cognitive/Communication Assessment  functional  -AC functional  -MJ functional  -LR     Orientation Status  oriented x 4  -AC oriented x 4  -MJ oriented x 4;required verbal cueing (specifiy in comments)  -LR     Follows Commands/Answers Questions  100% of the time;needs cueing;needs repetition  -% of the time;able to follow multi-step instructions;needs cueing  -% of the time;able to follow single-step instructions;needs cueing  -LR     Personal Safety  mild impairment;impulsive  -AC mild impairment;impulsive  -MJ WNL/WFL  -LR     ROM (Range of Motion)    General ROM    lower extremity range of motion deficits identified  -LR     MMT (Manual Muscle Testing)    General MMT Assessment    lower extremity strength deficits identified  -LR     Mobility Assessment/Training    Extremity Weight-Bearing Status  left upper extremity  -AC left lower extremity  -MJ left lower extremity  -LR     Left Lower Extremity Weight-Bearing  weight-bearing as tolerated  -AC weight-bearing as tolerated  -MJ weight-bearing as tolerated  -LR     Bed Mobility, Assessment/Treatment    Bed Mobility, Assistive Device  bed rails;head of bed elevated;leg   -AC bed rails;head of  bed elevated;leg   -MJ head of bed elevated;bed rails  -LR     Bed Mob, Supine to Sit, [place]  contact guard assist;verbal cues required  -AC contact guard assist;verbal cues required  -MJ verbal cues required;minimum assist (75% patient effort)  -LR     Bed Mob, Sit to Supine, [place]   not tested   Pt UIC  -MJ not tested   UIC at end of eval.   -LR     Bed Mobility, Safety Issues  decreased use of arms for pushing/pulling;decreased use of legs for bridging/pushing  -AC decreased use of legs for bridging/pushing  -MJ decreased use of legs for bridging/pushing  -LR     Bed Mobility, Impairments  ROM decreased;strength decreased  -AC ROM decreased;strength decreased;pain  -MJ ROM decreased;strength decreased;pain  -LR     Bed Mobility, Comment   Verbal cues for sequencing with leg , increased time and effort to perform  -MJ Verbal cues to move LEs towards EOB and to push up from bed to raise trunk into sitting and to scoot hips out to get feet on floor. Min assist required to move L LE. Pt c/o nausea upon sitting up and proceeded to vomit in basin. Notified RN.   -LR     Transfer Assessment/Treatment    Transfers, Sit-Stand [place]  verbal cues required;contact guard assist;1 person + 1 person to manage equipment  -AC contact guard assist;verbal cues required  -MJ verbal cues required;contact guard assist;2 person assist required  -LR     Transfers, Stand-Sit [place]  verbal cues required;contact guard assist  -AC contact guard assist;verbal cues required  -MJ verbal cues required;contact guard assist;2 person assist required  -LR     Transfers, Sit-Stand-Sit, Assist Device  rolling walker  -AC rolling walker  -MJ rolling walker  -LR     Transfer, Safety Issues  sequencing ability decreased  -AC sequencing ability decreased;step length decreased;weight-shifting ability decreased  -MJ sequencing ability decreased;step length decreased;weight-shifting ability decreased  -LR      Transfer, Impairments  strength decreased;impaired balance  -AC ROM decreased;strength decreased;pain  -MJ ROM decreased;strength decreased;pain  -LR     Transfer, Comment  Vcs for correct hand placement  - Verbal cues for correct hand placement and to step L LE out prior to t/f. Pt stood before gait belt donned and RW in front of her, cues for safety  - Verbal cues to push up from bed to stand and to reach back for chair to lower into sitting. Verbal cues to step L LE out before t/f.   -LR     Functional Mobility    Functional Mobility- Ind. Level  verbal cues required;contact guard assist  -       Functional Mobility- Device  rolling walker  -       Functional Mobility-Distance (Feet)  250  -       Lower Body Dressing Assessment/Training    LB Dressing Assess/Train, Clothing Type  doffing:;donning:;slipper socks  -       LB Dressing Assess/Train, Assist Device  reacher;sock-aid  -       LB Dressing Assess/Train, Position  sitting  -       LB Dressing Assess/Train, [place]  verbal cues required;minimum assist (75% patient effort)  -       Therapy Exercises    Exercise Protocols   total knee  -MJ total knee  -LR     Total Knee Exercises   left:;15 reps;completed protocol;with assist;ankle pumps/circles;quad set;glut set;heel slide stretch;SLR;SAQ;hip abduction/adduction;LAQ   Cues for technique. Ady w/ SLR, hip abd, SAQ  - left:;10 reps;ankle pumps/circles;quad set;glut set   cues for technique  -LR     Sensory Assessment/Intervention    Sensory Impairment    --   c/o mild numbness; able to actively DF bilaterally  -LR     Positioning and Restraints    Pre-Treatment Position  in bed  -AC in bed  -MJ in bed  -LR     Post Treatment Position  chair  -AC chair  - chair  -LR     In Chair  notified nsg;reclined;call light within reach;encouraged to call for assist  - notified nsg;reclined;call light within reach;encouraged to call for assist  - notified nsg;reclined;sitting;call light  within reach;encouraged to call for assist;exit alarm on;legs elevated;compression device;with family/caregiver  -LR     General LE Assessment    ROM    RLE ROM was WFL;knee, left: LE ROM deficit  -LR     ROM Detail    L knee AROM impaired 25%  -LR     Lower Extremity    Lower Ext Manual Muscle Testing    right LE strength is WFL;left knee strength deficit  -LR     Lower Ext Manual Muscle Testing Detail    L knee functionally 4-/5  -LR       User Key  (r) = Recorded By, (t) = Taken By, (c) = Cosigned By    Initials Name Effective Dates    AC Sugar Flores OT 06/23/15 -     LR Yenny Lujan, PT 06/19/15 -     NM Paige Fair, RN 06/16/16 -     LANE Linares, PT 03/14/16 -     MH aCry Bryant, RN 03/14/16 -            Occupational Therapy Education     Title: PT OT SLP Therapies (Active)     Topic: Occupational Therapy (Active)     Point: ADL training (Done)    Description: Instruct learner(s) on proper safety adaptation and remediation techniques during self care or transfers.   Instruct in proper use of assistive devices.    Learning Progress Summary    Learner Readiness Method Response Comment Documented by Status   Patient Acceptance E VU Educated in use of AE for LBD  09/08/17 1319 Done                      User Key     Initials Effective Dates Name Provider Type Discipline     06/23/15 -  Sugar Flores OT Occupational Therapist OT                  OT Recommendation and Plan  Anticipated Equipment Needs At Discharge:  (self care kit for LBD)  Anticipated Discharge Disposition: home with assist, home with home health  Therapy Frequency: daily  Plan of Care Review  Plan Of Care Reviewed With: patient  Outcome Summary/Follow up Plan: OT eval complete. S/P LTKR.  Pt educated in use of AE for LBD and pt gave return demo to don/doff socks given min a.  Pt CGA with bed mobility and transfer.  Pt will benefit from OT to advance pt toward  increased independence with ADLs .            OT Goals        09/08/17 1320          Bed Mobility OT LTG    Bed Mobility OT LTG, Date Established (P)  09/08/17  -AC      Bed Mobility OT LTG, Time to Achieve (P)  by discharge  -AC      Bed Mobility OT LTG, Activity Type (P)  supine to sit/sit to supine  -AC      Bed Mobility OT LTG, Jack Level (P)  supervision required  -AC      Bed Mobility OT LTG, Assist Device (P)  leg   -AC      Transfer Training OT LTG    Transfer Training OT LTG, Date Established (P)  09/08/17  -AC      Transfer Training OT LTG, Time to Achieve (P)  by discharge  -AC      Transfer Training OT LTG, Activity Type (P)  bed to chair /chair to bed;toilet;tub  -AC      Transfer Training OT LTG, [place] Level (P)  supervision required  -AC      Transfer Training OT LTG, Assist Device (P)  walker, rolling;tub bench  -AC      LB Dressing OT LTG    LB Dressing Goal OT LTG, Date Established (P)  09/08/17  -AC      LB Dressing Goal OT LTG, Time to Achieve (P)  by discharge  -AC      LB Dressing Goal OT LTG, Jack Level (P)  supervision required;verbal cues required  -AC      LB Dressing Goal OT LTG, Adaptive Equipment (P)  reacher;shoe horn, long handled;sock-aid  -AC        User Key  (r) = Recorded By, (t) = Taken By, (c) = Cosigned By    Initials Name Provider Type    SHARON Flores OT Occupational Therapist                Outcome Measures       09/08/17 1042 09/08/17 1036 09/07/17 1535    How much help from another person do you currently need...    Turning from your back to your side while in flat bed without using bedrails?  3  -MJ 3  -LR    Moving from lying on back to sitting on the side of a flat bed without bedrails?  3  -MJ 3  -LR    Moving to and from a bed to a chair (including a wheelchair)?  3  -MJ 3  -LR    Standing up from a chair using your arms (e.g., wheelchair, bedside chair)?  3  -MJ 3  -LR    Climbing 3-5 steps with a railing?  3  -MJ 2  -LR    To walk in hospital room?  3  -MJ 3  -LR    AM-PAC 6 Clicks Score  18   -MJ 17  -LR    How much help from another is currently needed...    Putting on and taking off regular lower body clothing? 2  -AC      Bathing (including washing, rinsing, and drying) 3  -AC      Toileting (which includes using toilet bed pan or urinal) 3  -AC      Putting on and taking off regular upper body clothing 4  -AC      Taking care of personal grooming (such as brushing teeth) 4  -AC      Eating meals 4  -AC      Score 20  -AC      Functional Assessment    Outcome Measure Options AM-PAC 6 Clicks Daily Activity (OT)  -AC AM-PAC 6 Clicks Basic Mobility (PT)  -MJ AM-PAC 6 Clicks Basic Mobility (PT)  -LR      User Key  (r) = Recorded By, (t) = Taken By, (c) = Cosigned By    Initials Name Provider Type    AC Sugar Flores, OT Occupational Therapist    LR Yenny Lujan, PT Physical Therapist    LANE Linares, PT Physical Therapist          Time Calculation:   OT Start Time: 1042    Therapy Charges for Today     Code Description Service Date Service Provider Modifiers Qty    [id number]  OT EVAL LOW COMPLEXITY 4 9/8/2017 Sugar Flores OT GO 1               Sugar Flores OT  9/8/2017

## 2017-09-08 NOTE — PROGRESS NOTES
/64 (BP Location: Right arm, Patient Position: Lying)  Pulse 82  Temp 98.3 °F (36.8 °C) (Temporal Artery )   Resp 16  SpO2 94%    Lab Results (last 24 hours)     Procedure Component Value Units Date/Time    POC Glucose Fingerstick [[id number]]  (Normal) Collected:  09/07/17 1249    Specimen:  Blood Updated:  09/07/17 1251     Glucose 71 mg/dL     Narrative:       Meter: [id number] : [id number] Sharlene JC    POC Glucose Fingerstick [[id number]]  (Normal) Collected:  09/07/17 1549    Specimen:  Blood Updated:  09/07/17 1550     Glucose 81 mg/dL     Narrative:       Verify with Lab Meter: [id number] : [id number] Bonita Escobar    POC Glucose Fingerstick [[id number]]  (Normal) Collected:  09/07/17 2046    Specimen:  Blood Updated:  09/07/17 2123     Glucose 106 mg/dL     Narrative:       Meter: [id number] : [id number] Valeriano Moore    POC Glucose Fingerstick [[id number]]  (Normal) Collected:  09/08/17 0707    Specimen:  Blood Updated:  09/08/17 0711     Glucose 106 mg/dL     Narrative:       Verify with Lab Meter: [id number] : [id number] Cj Chairez    CBC & Differential [[id number]] Collected:  09/08/17 0621    Specimen:  Blood Updated:  09/08/17 0714    Narrative:       The following orders were created for panel order CBC & Differential.  Procedure                               Abnormality         Status                     ---------                               -----------         ------                     CBC Auto Differential[[id number]]        Abnormal            Final result                 Please view results for these tests on the individual orders.    CBC Auto Differential [[id number]]  (Abnormal) Collected:  09/08/17 0621    Specimen:  Blood Updated:  09/08/17 0714     WBC 10.61 10*3/mm3      RBC 4.22 10*6/mm3      Hemoglobin 12.9 g/dL      Hematocrit 38.2 %      MCV 90.5 fL      MCH 30.6 pg      MCHC 33.8 g/dL      RDW 13.8 %      RDW-SD 46.1 fl      MPV 11.0 fL      Platelets 238  10*3/mm3      Neutrophil % 67.0 %      Lymphocyte % 17.9 (L) %      Monocyte % 13.9 (H) %      Eosinophil % 0.7 %      Basophil % 0.3 %      Immature Grans % 0.2 %      Neutrophils, Absolute 7.11 10*3/mm3      Lymphocytes, Absolute 1.90 10*3/mm3      Monocytes, Absolute 1.48 (H) 10*3/mm3      Eosinophils, Absolute 0.07 10*3/mm3      Basophils, Absolute 0.03 10*3/mm3      Immature Grans, Absolute 0.02 10*3/mm3     Basic Metabolic Panel [[id number]]  (Abnormal) Collected:  09/08/17 0621    Specimen:  Blood Updated:  09/08/17 0722     Glucose 107 (H) mg/dL      BUN 7 (L) mg/dL      Creatinine 0.80 mg/dL      Sodium 137 mmol/L      Potassium 3.7 mmol/L      Chloride 104 mmol/L      CO2 27.0 mmol/L      Calcium 8.7 mg/dL      eGFR Non African Amer 72 mL/min/1.73      BUN/Creatinine Ratio 8.8     Anion Gap 6.0 mmol/L     Narrative:       [employer] Guidelines    Stage     Description        GFR  1         Normal or High     90+  2         Mild decrease      60-89  3         Moderate decrease  30-59  4         Severe decrease    15-29  5         Kidney failure     <15          Imaging Results (last 24 hours)     Procedure Component Value Units Date/Time    XR Knee 1 or 2 View Left [[id number]] Collected:  09/07/17 1315     Updated:  09/07/17 1322    Narrative:       EXAMINATION: XR KNEE 1 OR 2 VW LEFT- 09/07/2017     INDICATION: Post-Op Knee Arthoplasty      COMPARISON: NONE     FINDINGS: The left knee is examined in 2 projections. There are  postoperative changes related to a total left knee prosthesis. There is  no fracture, dislocation or acute abnormality.           Impression:       Expected postoperative findings.     D:  09/07/2017  E:  09/07/2017     This report was finalized on 9/7/2017 1:20 PM by Dr. Navid Bruce MD.             Patient Care Team:  Marylu Kimbrough MD as PCP - General (Internal Medicine)    SUBJECTIVE: Qi reports she is doing well.  Pain is well-controlled.  She walked 160  feet with therapy yesterday.  She is tolerating a regular diet.    PHYSICAL EXAM  Left knee incision is clean, dry and intact with mesh dressing in place  Thigh and calf are soft nontender  Neurovascular intact distally     Principal Problem:    Status post total left knee replacement  Active Problems:    Osteoarthritis of left knee    Prediabetes    GERD (gastroesophageal reflux disease)      PLAN / DISPOSITION: 66-year-old female postop day #1 s/p left total knee arthroplasty  -Mobilize with PT/OT as tolerated  -Aspirin for DVT prophylaxis, H&H stable  -Plan discharge home possibly tomorrow with home therapy, prescription for outpatient therapy written as well for when she is done with home PT  -Follow-up in 2 weeks    Jacques Younger MD  09/08/17  7:44 AM

## 2017-09-08 NOTE — PLAN OF CARE
Problem: Patient Care Overview (Adult)  Goal: Plan of Care Review  Outcome: Ongoing (interventions implemented as appropriate)    09/08/17 1317   Coping/Psychosocial Response Interventions   Plan Of Care Reviewed With patient   Patient Care Overview   Progress improving   Outcome Evaluation   Outcome Summary/Follow up Plan Pt increased gait distance to 250 feet with CGA and RW. Will obtain ROM measure and progress to stair training in PM         Problem: Knee Replacement, Total (Adult)  Goal: Signs and Symptoms of Listed Potential Problems Will be Absent or Manageable (Knee Replacement, Total)  Outcome: Ongoing (interventions implemented as appropriate)    09/08/17 1317   Knee Replacement, Total   Problems Assessed (Total Knee Replacement) pain;decreased range of motion;functional decline/self care deficit   Problems Present (Total Knee Replacement) pain;decreased range of motion;functional decline/self care deficit         Problem: Inpatient Physical Therapy  Goal: Bed Mobility Goal LTG- PT  Outcome: Ongoing (interventions implemented as appropriate)    09/07/17 1535 09/08/17 1317   Bed Mobility PT LTG   Bed Mobility PT LTG, Date Established 09/07/17 --    Bed Mobility PT LTG, Time to Achieve 5 days --    Bed Mobility PT LTG, Activity Type supine to sit/sit to supine --    Bed Mobility PT LTG, [place] Level conditional independence --    Bed Mobility PT LTG, Date Goal Reviewed --  09/08/17   Bed Mobility PT LTG, Outcome --  goal ongoing       Goal: Transfer Training Goal 1 LTG- PT  Outcome: Ongoing (interventions implemented as appropriate)    09/07/17 1535 09/08/17 1317   Transfer Training PT LTG   Transfer Training PT LTG, Date Established 09/07/17 --    Transfer Training PT LTG, Time to Achieve 5 days --    Transfer Training PT LTG, Activity Type sit to stand/stand to sit --    Transfer Training PT LTG, [place] Level conditional independence --    Transfer Training PT LTG, Assist Device walker, rolling  --    Transfer Training PT LTG, Date Goal Reviewed --  09/08/17   Transfer Training PT LTG, Outcome --  goal ongoing       Goal: Gait Training Goal LTG- PT  Outcome: Ongoing (interventions implemented as appropriate)    09/07/17 1535 09/08/17 1317   Gait Training PT LTG   Gait Training Goal PT LTG, Date Established 09/07/17 --    Gait Training Goal PT LTG, Time to Achieve 5 days --    Gait Training Goal PT LTG, [place] Level conditional independence --    Gait Training Goal PT LTG, Assist Device walker, rolling --    Gait Training Goal PT LTG, Distance to Achieve 500 feet --    Gait Training Goal PT LTG, Date Goal Reviewed --  09/08/17   Gait Training Goal PT LTG, Outcome --  goal ongoing       Goal: Stair Training Goal LTG- PT  Outcome: Ongoing (interventions implemented as appropriate)    09/07/17 1635 09/08/17 1317   Stair Training PT LTG   Stair Training Goal PT LTG, Date Established 09/07/17 --    Stair Training Goal PT LTG, Time to Achieve 5 days --    Stair Training Goal PT LTG, Number of Steps 2 --    Stair Training Goal PT LTG, [place] Level contact guard assist --    Stair Training Goal PT LTG, Assist Device cane, straight;other (see comments)  (HHA) --    Stair Training Goal PT LTG, Date Goal Reviewed --  09/08/17   Stair Training Goal PT LTG, Outcome --  goal ongoing       Goal: Range of Motion Goal LTG- PT  Outcome: Ongoing (interventions implemented as appropriate)    09/07/17 1535 09/08/17 1317   Range of Motion PT LTG   Range of Motion Goal PT LTG, Date Established 09/07/17 --    Range of Motion Goal PT LTG, Time to Achieve 5 days --    Range fo Motion Goal PT LTG, Joint L knee --    Range of Motion Goal PT LTG, AAROM Measure 0-90 degrees --    Range of Motion Goal PT LTG, Date Goal Reviewed --  09/08/17   Range of Motion Goal PT LTG, Outcome --  goal ongoing       Goal: Stair Training Goal LTG- PT  Outcome: Ongoing (interventions implemented as appropriate)    09/07/17 1635 09/08/17 1317    Stair Training PT LTG   Stair Training Goal PT LTG, Date Established 09/07/17 --    Stair Training Goal PT LTG, Time to Achieve 5 days --    Stair Training Goal PT LTG, Number of Steps 2 --    Stair Training Goal PT LTG, [place] Level contact guard assist --    Stair Training Goal PT LTG, Assist Device cane, straight;other (see comments)  (HHA) --    Stair Training Goal PT LTG, Date Goal Reviewed --  09/08/17   Stair Training Goal PT LTG, Outcome --  goal ongoing

## 2017-09-09 VITALS
RESPIRATION RATE: 16 BRPM | DIASTOLIC BLOOD PRESSURE: 74 MMHG | HEIGHT: 61 IN | OXYGEN SATURATION: 94 % | SYSTOLIC BLOOD PRESSURE: 129 MMHG | BODY MASS INDEX: 28.14 KG/M2 | TEMPERATURE: 99.6 F | HEART RATE: 98 BPM | WEIGHT: 149.03 LBS

## 2017-09-09 LAB
BASOPHILS # BLD AUTO: 0.03 10*3/MM3 (ref 0–0.2)
BASOPHILS NFR BLD AUTO: 0.3 % (ref 0–1)
DEPRECATED RDW RBC AUTO: 48 FL (ref 37–54)
EOSINOPHIL # BLD AUTO: 0.16 10*3/MM3 (ref 0–0.3)
EOSINOPHIL NFR BLD AUTO: 1.5 % (ref 0–3)
ERYTHROCYTE [DISTWIDTH] IN BLOOD BY AUTOMATED COUNT: 14.3 % (ref 11.3–14.5)
GLUCOSE BLDC GLUCOMTR-MCNC: 100 MG/DL (ref 70–130)
GLUCOSE BLDC GLUCOMTR-MCNC: 104 MG/DL (ref 70–130)
HCT VFR BLD AUTO: 33.4 % (ref 34.5–44)
HGB BLD-MCNC: 10.9 G/DL (ref 11.5–15.5)
IMM GRANULOCYTES # BLD: 0.02 10*3/MM3 (ref 0–0.03)
IMM GRANULOCYTES NFR BLD: 0.2 % (ref 0–0.6)
LYMPHOCYTES # BLD AUTO: 1.4 10*3/MM3 (ref 0.6–4.8)
LYMPHOCYTES NFR BLD AUTO: 13.3 % (ref 24–44)
MCH RBC QN AUTO: 29.9 PG (ref 27–31)
MCHC RBC AUTO-ENTMCNC: 32.6 G/DL (ref 32–36)
MCV RBC AUTO: 91.5 FL (ref 80–99)
MONOCYTES # BLD AUTO: 1.19 10*3/MM3 (ref 0–1)
MONOCYTES NFR BLD AUTO: 11.3 % (ref 0–12)
NEUTROPHILS # BLD AUTO: 7.74 10*3/MM3 (ref 1.5–8.3)
NEUTROPHILS NFR BLD AUTO: 73.4 % (ref 41–71)
PLATELET # BLD AUTO: 199 10*3/MM3 (ref 150–450)
PMV BLD AUTO: 11.2 FL (ref 6–12)
RBC # BLD AUTO: 3.65 10*6/MM3 (ref 3.89–5.14)
WBC NRBC COR # BLD: 10.54 10*3/MM3 (ref 3.5–10.8)

## 2017-09-09 PROCEDURE — 94799 UNLISTED PULMONARY SVC/PX: CPT

## 2017-09-09 PROCEDURE — 82962 GLUCOSE BLOOD TEST: CPT

## 2017-09-09 PROCEDURE — 97110 THERAPEUTIC EXERCISES: CPT

## 2017-09-09 PROCEDURE — 97116 GAIT TRAINING THERAPY: CPT

## 2017-09-09 PROCEDURE — 25010000002 ROPIVACAINE HCL-NACL 0.2-0.9 % SOLUTION: Performed by: NURSE ANESTHETIST, CERTIFIED REGISTERED

## 2017-09-09 PROCEDURE — 85025 COMPLETE CBC W/AUTO DIFF WBC: CPT | Performed by: ORTHOPAEDIC SURGERY

## 2017-09-09 RX ORDER — HYDROCODONE BITARTRATE AND ACETAMINOPHEN 7.5; 325 MG/1; MG/1
1 TABLET ORAL EVERY 4 HOURS PRN
Qty: 30 TABLET | Refills: 0 | Status: SHIPPED | OUTPATIENT
Start: 2017-09-09 | End: 2017-09-18

## 2017-09-09 RX ADMIN — ASPIRIN 325 MG: 325 TABLET, DELAYED RELEASE ORAL at 09:12

## 2017-09-09 RX ADMIN — Medication 2 TABLET: at 09:12

## 2017-09-09 RX ADMIN — ACETAMINOPHEN 650 MG: 325 TABLET, FILM COATED ORAL at 06:34

## 2017-09-09 RX ADMIN — HYDROCODONE BITARTRATE AND ACETAMINOPHEN 2 TABLET: 7.5; 325 TABLET ORAL at 10:19

## 2017-09-09 RX ADMIN — PSYLLIUM HUSK 1 PACKET: 3.5 GRANULE ORAL at 09:12

## 2017-09-09 RX ADMIN — Medication 12 ML/HR: at 12:17

## 2017-09-09 RX ADMIN — MELOXICAM 15 MG: 7.5 TABLET ORAL at 09:12

## 2017-09-09 RX ADMIN — MAGNESIUM HYDROXIDE 10 ML: 2400 SUSPENSION ORAL at 09:12

## 2017-09-09 NOTE — DISCHARGE INSTRUCTIONS
You have a prineo dressing in place. This dressing is waterproof and you may shower with it on. It will remain in place for 2 weeks and does not need to be covered.    [facility] will provide you with home health. They will contact you within 1-3 days to set up your first appointment.     A rolling walker has been provided to you by [employer].    You have an On-Q ball to assist with pain control. If you have any questions, problems, or concerns please call the phone number listed on the blue bracelet.

## 2017-09-09 NOTE — PROGRESS NOTES
[facility]    Acute pain service Inpatient Progress Note    Patient Name: Qi Gupta  :  1950  MRN:  [id number]        Acute Pain  Service Inpatient Progress Note:    Analgesia:Good  Pain Score:3/10  LOC: alert and awake  Resp Status: room air  Cardiac: VS stable  Side Effects:None  Catheter Site:clean  Cath type: peripheral nerve cath(MOOG pump)  Infusion rate: 12ml/hr  Catheter Plan:Catheter to remain Insitu and Continue catheter infusion rate unchanged

## 2017-09-09 NOTE — THERAPY DISCHARGE NOTE
Acute Care - Physical Therapy Treatment Note/Discharge  [facility]     Patient Name: Qi Gupta  : 1950  MRN: [id number]  Today's Date: 2017  Onset of Illness/Injury or Date of Surgery Date: 17  Date of Referral to PT: 17  Referring Physician: MD Aren    Admit Date: 2017    Visit Dx:    ICD-10-CM ICD-9-CM   1. Impaired functional mobility, balance, gait, and endurance Z74.09 V49.89   2. Status post total left knee replacement Z96.652 V43.65   3. Impaired mobility and ADLs Z74.09 799.89     Patient Active Problem List   Diagnosis   • Osteoarthritis of left knee   • Status post total left knee replacement   • Prediabetes   • GERD (gastroesophageal reflux disease)       Physical Therapy Education     Title: PT OT SLP Therapies (Active)     Topic: Physical Therapy (Done)     Point: Mobility training (Done)    Learning Progress Summary    Learner Readiness Method Response Comment Documented by Status   Patient Acceptance D,E,H VU,NR Educated on correct gait mechanics, stair training, and correct car t/f technique. Issued and reviewed written/illustrated HEP.  17 1012 Done    Acceptance E,D VU,NR Reviewed HEP, gait mechanics, stairs, benefits of early ROM  17 1630 Done    Acceptance E,D NR,VU Reviewed gait mechanics, HEP  17 1316 Done    Acceptance D,E VU,NR Educated on weight bearing status and correct gait mechanics.  17 1728 Done   Significant Other Acceptance D,E,H VU,NR Educated on correct gait mechanics, stair training, and correct car t/f technique. Issued and reviewed written/illustrated HEP.  17 1012 Done    Acceptance E,D VU,NR Reviewed HEP, gait mechanics, stairs, benefits of early ROM  17 1630 Done               Point: Home exercise program (Done)    Learning Progress Summary    Learner Readiness Method Response Comment Documented by Status   Patient Acceptance D,E,H VU,NR Educated on correct gait mechanics, stair training, and  correct car t/f technique. Issued and reviewed written/illustrated HEP.  09/09/17 1012 Done    Acceptance E,D VU,NR Reviewed HEP, gait mechanics, stairs, benefits of early ROM  09/08/17 1630 Done    Acceptance E,D NR,VU Reviewed gait mechanics, HEP  09/08/17 1316 Done    Acceptance D,E VU,NR Educated on weight bearing status and correct gait mechanics.  09/07/17 1728 Done   Significant Other Acceptance D,E,H VU,NR Educated on correct gait mechanics, stair training, and correct car t/f technique. Issued and reviewed written/illustrated HEP.  09/09/17 1012 Done    Acceptance E,D VU,NR Reviewed HEP, gait mechanics, stairs, benefits of early ROM  09/08/17 1630 Done               Point: Body mechanics (Done)    Learning Progress Summary    Learner Readiness Method Response Comment Documented by Status   Patient Acceptance D,E,H VU,NR Educated on correct gait mechanics, stair training, and correct car t/f technique. Issued and reviewed written/illustrated HEP.  09/09/17 1012 Done    Acceptance E,D VU,NR Reviewed HEP, gait mechanics, stairs, benefits of early ROM  09/08/17 1630 Done    Acceptance E,D NR,VU Reviewed gait mechanics, HEP  09/08/17 1316 Done    Acceptance D,E VU,NR Educated on weight bearing status and correct gait mechanics.  09/07/17 1728 Done   Significant Other Acceptance D,E,H VU,NR Educated on correct gait mechanics, stair training, and correct car t/f technique. Issued and reviewed written/illustrated HEP.  09/09/17 1012 Done    Acceptance E,D VU,NR Reviewed HEP, gait mechanics, stairs, benefits of early ROM  09/08/17 1630 Done               Point: Precautions (Done)    Learning Progress Summary    Learner Readiness Method Response Comment Documented by Status   Patient Acceptance D,E,H VU,NR Educated on correct gait mechanics, stair training, and correct car t/f technique. Issued and reviewed written/illustrated HEP.  09/09/17 1012 Done    Acceptance E,D VU,NR Reviewed HEP,  gait mechanics, stairs, benefits of early ROM  09/08/17 1630 Done    Acceptance E,D CAT,JENIFFER Reviewed gait mechanics, HEP  09/08/17 1316 Done    Acceptance D,E JENIFFER,NR Educated on weight bearing status and correct gait mechanics.  09/07/17 1728 Done   Significant Other Acceptance D,E,H JENIFFER,NR Educated on correct gait mechanics, stair training, and correct car t/f technique. Issued and reviewed written/illustrated HEP.  09/09/17 1012 Done    Acceptance E,D JENIFFER,NR Reviewed HEP, gait mechanics, stairs, benefits of early ROM  09/08/17 1630 Done                      User Key     Initials Effective Dates Name Provider Type Discipline     06/19/15 -  Yenny Lujan, PT Physical Therapist PT     03/14/16 -  Bonifacio Linares, PT Physical Therapist PT                    IP PT Goals       09/09/17 0831 09/08/17 1631 09/08/17 1317    Bed Mobility PT LTG    Bed Mobility PT LTG, Date Established 09/07/17  -LR      Bed Mobility PT LTG, Time to Achieve 5 days  -LR      Bed Mobility PT LTG, Activity Type supine to sit/sit to supine  -LR      Bed Mobility PT LTG, [place] Level conditional independence  -LR      Bed Mobility PT LTG, Date Goal Reviewed 09/09/17  -LR 09/08/17  -MJ 09/08/17  -MJ    Bed Mobility PT LTG, Outcome goal met  -LR goal ongoing  -MJ goal ongoing  -MJ    Transfer Training PT LTG    Transfer Training PT LTG, Date Established 09/07/17  -LR      Transfer Training PT LTG, Time to Achieve 5 days  -LR      Transfer Training PT LTG, Activity Type sit to stand/stand to sit  -LR      Transfer Training PT LTG, [place] Level conditional independence  -LR      Transfer Training PT LTG, Assist Device walker, rolling  -LR      Transfer Training PT  LTG, Date Goal Reviewed 09/09/17  -LR 09/08/17  -MJ 09/08/17  -MJ    Transfer Training PT LTG, Outcome goal met  -LR goal ongoing  -MJ goal ongoing  -MJ    Gait Training PT LTG    Gait Training Goal PT LTG, Date Established 09/07/17  -LR      Gait Training Goal PT  LTG, Time to Achieve 5 days  -LR      Gait Training Goal PT LTG, [place] Level conditional independence  -LR      Gait Training Goal PT LTG, Assist Device walker, rolling  -LR      Gait Training Goal PT LTG, Distance to Achieve 500 feet  -LR      Gait Training Goal PT LTG, Date Goal Reviewed 09/09/17  -LR 09/08/17  -MJ 09/08/17  -MJ    Gait Training Goal PT LTG, Outcome goal not met  -LR goal ongoing  -MJ goal ongoing  -MJ    Gait Training Goal PT LTG, Reason Goal Not Met discharged from facility  -LR      Stair Training PT LTG    Stair Training Goal PT LTG, Date Goal Reviewed  09/08/17  -MJ 09/08/17  -MJ    Stair Training Goal PT LTG, Outcome  goal met  -MJ goal ongoing  -MJ    Range of Motion PT LTG    Range of Motion Goal PT LTG, Date Established 09/07/17  -LR      Range of Motion Goal PT LTG, Time to Achieve 5 days  -LR      Range fo Motion Goal PT LTG, Joint L knee  -LR      Range of Motion Goal PT LTG, AAROM Measure 0-90 degrees  -LR      Range of Motion Goal PT LTG, Date Goal Reviewed 09/09/17  -LR 09/08/17  -MJ 09/08/17  -MJ    Range of Motion Goal PT LTG, Outcome goal not met  -LR goal ongoing  -MJ goal ongoing  -MJ    Reason Goal Not Met (ROM) PT LTG discharged from facility  -LR        09/07/17 1635 09/07/17 1535       Bed Mobility PT LTG    Bed Mobility PT LTG, Date Established  09/07/17  -LR     Bed Mobility PT LTG, Time to Achieve  5 days  -LR     Bed Mobility PT LTG, Activity Type  supine to sit/sit to supine  -LR     Bed Mobility PT LTG, [place] Level  conditional independence  -LR     Bed Mobility PT LTG, Date Goal Reviewed  09/07/17  -LR     Bed Mobility PT LTG, Outcome  goal ongoing  -LR     Transfer Training PT LTG    Transfer Training PT LTG, Date Established  09/07/17  -LR     Transfer Training PT LTG, Time to Achieve  5 days  -LR     Transfer Training PT LTG, Activity Type  sit to stand/stand to sit  -LR     Transfer Training PT LTG, [place] Level  conditional independence  -LR      Transfer Training PT LTG, Assist Device  walker, rolling  -LR     Transfer Training PT  LTG, Date Goal Reviewed  09/07/17  -LR     Transfer Training PT LTG, Outcome  goal ongoing  -LR     Gait Training PT LTG    Gait Training Goal PT LTG, Date Established  09/07/17  -LR     Gait Training Goal PT LTG, Time to Achieve  5 days  -LR     Gait Training Goal PT LTG, [place] Level  conditional independence  -LR     Gait Training Goal PT LTG, Assist Device  walker, rolling  -LR     Gait Training Goal PT LTG, Distance to Achieve  500 feet  -LR     Gait Training Goal PT LTG, Date Goal Reviewed  09/07/17  -LR     Gait Training Goal PT LTG, Outcome  goal ongoing  -LR     Stair Training PT LTG    Stair Training Goal PT LTG, Date Established 09/07/17  -LR 09/07/17  -LR     Stair Training Goal PT LTG, Time to Achieve 5 days  -LR 5 days  -LR     Stair Training Goal PT LTG, Number of Steps 2  -LR 1  -LR     Stair Training Goal PT LTG, [place] Level contact guard assist  -LR conditional independence  -LR     Stair Training Goal PT LTG, Assist Device cane, straight;other (see comments)   HHA  -LR walker, rolling;other (see comments)   backwards  -LR     Stair Training Goal PT LTG, Date Goal Reviewed 09/07/17  -LR 09/07/17  -LR     Stair Training Goal PT LTG, Outcome goal ongoing  -LR goal ongoing  -LR     Range of Motion PT LTG    Range of Motion Goal PT LTG, Date Established  09/07/17  -LR     Range of Motion Goal PT LTG, Time to Achieve  5 days  -LR     Range fo Motion Goal PT LTG, Joint  L knee  -LR     Range of Motion Goal PT LTG, AAROM Measure  0-90 degrees  -LR     Range of Motion Goal PT LTG, Date Goal Reviewed  09/07/17  -LR     Range of Motion Goal PT LTG, Outcome  goal ongoing  -LR       User Key  (r) = Recorded By, (t) = Taken By, (c) = Cosigned By    Initials Name Provider Type    LR Yenny Lujan, PT Physical Therapist    LANE Linares, PT Physical Therapist              Adult Rehabilitation Note        09/09/17 0831 09/08/17 1434 09/08/17 1042    Rehab Assessment/Intervention    Discipline physical therapist  -LR physical therapist  -MJ     Document Type therapy note (daily note);discharge summary  -LR therapy note (daily note)  -MJ     Subjective Information agree to therapy;complains of;pain  -LR agree to therapy;complains of;pain  -MJ     Patient Effort, Rehab Treatment good  -LR good  -MJ     Symptoms Noted During/After Treatment increased pain  -LR increased pain  -MJ     Symptoms Noted Comment Notified RN.   -LR RN notified  -MJ     Precautions/Limitations fall precautions;other (see comments)   L adductor canal nerve catheter  -LR fall precautions;other (see comments)   L adductor nerve catheter  -MJ     Recorded by [LR] Yenny Lujan, PT [MJ] Bonifacio Linares, PT     Pain Assessment    Pain Assessment 0-10  -LR 0-10  -MJ     Pain Score 4  -LR 4  -MJ     Post Pain Score 5  -LR 6  -MJ     Pain Type Acute pain  -LR Acute pain  -MJ     Pain Location Knee  -LR Knee  -MJ     Pain Orientation Left;Anterior  -LR Left;Anterior  -MJ     Pain Intervention(s) Repositioned;Ambulation/increased activity  -LR Repositioned;Ambulation/increased activity  -MJ     Recorded by [LR] Yenny Lujan, PT [MJ] Bonifacio Linares PT     Cognitive Assessment/Intervention    Current Cognitive/Communication Assessment functional  -LR functional  -MJ     Orientation Status oriented x 4;required verbal cueing (specifiy in comments)  -LR oriented x 4  -MJ     Follows Commands/Answers Questions 100% of the time;able to follow single-step instructions;needs cueing;needs repetition  -% of the time;able to follow multi-step instructions;needs cueing  -MJ     Personal Safety mild impairment;impulsive  -LR mild impairment;impulsive  -MJ     Recorded by [LR] Yenny Lujan, PT [MJ] Bonifacio Linares PT     General LE Assessment    ROM Detail 5-47 degrees; lacking 5 degrees of extension AROM, 47 degrees of AAROM flexion in sitting.    -LR L knee 10-59 degrees; pt lacking 10 degrees from full extension; pt seated to measure AAROM flexion  -MJ     Recorded by [LR] Yenny Lujan, PT [MJ] Bonifacio Linares, PT     Mobility Assessment/Training    Extremity Weight-Bearing Status left lower extremity  -LR left lower extremity  -MJ     Left Lower Extremity Weight-Bearing weight-bearing as tolerated  -LR weight-bearing as tolerated  -MJ     Recorded by [LR] Yenny Lujan, PT [MJ] Bonifacio Linares PT     Bed Mobility, Assessment/Treatment    Bed Mobility, Assistive Device head of bed elevated;bed rails  -LR bed rails;head of bed elevated;leg   -MJ     Bed Mob, Supine to Sit, [place] verbal cues required;conditional independence  -LR supervision required;verbal cues required  -MJ     Bed Mob, Sit to Supine, [place] verbal cues required;conditional independence  -LR supervision required;verbal cues required  -MJ     Bed Mobility, Safety Issues decreased use of legs for bridging/pushing  -LR decreased use of legs for bridging/pushing  -MJ     Bed Mobility, Impairments ROM decreased;strength decreased;pain  -LR ROM decreased;strength decreased;pain  -MJ     Bed Mobility, Comment Verbal cues for correct use of leg .   -LR Verbal cues for sequencing with leg   -MJ     Recorded by [LR] Yenny Lujan, PT [MJ] Bonifacio Linares, PT     Transfer Assessment/Treatment    Transfers, Sit-Stand [place] verbal cues required;conditional independence  -LR stand by assist;verbal cues required  -MJ     Transfers, Stand-Sit [place] verbal cues required;conditional independence  -LR stand by assist;verbal cues required  -MJ     Transfers, Sit-Stand-Sit, Assist Device rolling walker  -LR rolling walker  -MJ     Transfer, Safety Issues sequencing ability decreased;step length decreased;weight-shifting ability decreased  -LR sequencing ability decreased;step length decreased;weight-shifting ability decreased  -MJ     Transfer,  Impairments ROM decreased;strength decreased;pain  -LR ROM decreased;strength decreased;pain  -MJ     Transfer, Comment Verbal cues to step L LE out before t/f for increased comfort. Demonstrates correct hand placement without cues. Assisted to bathroom at end of treatment.   -LR Verbal cues for correct hand placement and to step L LE out prior to t/f. Pt stood before gait belt donned and RW in front of her despite cues  -MJ     Recorded by [LR] Yenny Lujan, PT [MJ] Bonifacio Linares, PT     Gait Assessment/Treatment    Gait, [place] Level verbal cues required;contact guard assist  -LR contact guard assist;verbal cues required  -     Gait, Assistive Device rolling walker  -LR rolling walker  -MJ     Gait, Distance (Feet) 250  -  -MJ     Gait, Gait Pattern Analysis swing-through gait  -LR swing-through gait  -     Gait, Gait Deviations left:;antalgic;decreased heel strike;forward flexed posture;step length decreased;toe-to-floor clearance decreased;weight-shifting ability decreased  -LR left:;antalgic;kristen decreased;step length decreased;weight-shifting ability decreased  -     Gait, Safety Issues sequencing ability decreased;step length decreased;weight-shifting ability decreased  -LR step length decreased;weight-shifting ability decreased  -MJ     Gait, Impairments ROM decreased;strength decreased;pain  -LR ROM decreased;strength decreased;pain  -MJ     Gait, Comment Patient ambulated with step through gait pattern at slow pace. Verbal cues for increased L knee flexion during swing phase and increased L LE weight bearing/stance phase. Improved with cues for correction. Gait limited by pain/fatigue.   -LR Pt demo step through gait pattern with smooth sequencing. Cues to increase LE weight bearing, improvement noted. Gait limited by pain and fatigue  -     Recorded by [LR] Yenny Lujan, PT [MJ] Bonifacio Linares, PT     Stairs Assessment/Treatment    Number of Stairs 3  -LR 6  -MJ      Stairs, Handrail Location other (see comments)   2 steps SPC on R, HHA on L; 1 step backwards with RW  -LR --   x3 w/ 2 HR; x2 w/ HHA and cane; x1 w/ RW  -MJ     Stairs, [place] Level verbal cues required;contact guard assist  -LR contact guard assist;verbal cues required  -MJ     Stairs, Assistive Device other (see comments)   2 steps with SPC and 1 step with RW  -LR --   RW for 1 step; cane for 2 steps in sequence  -MJ     Stairs, Technique Used step to step (ascending);step to step (descending)  -LR step to step (ascending);step to step (descending)  -MJ     Stairs, Safety Issues sequencing ability decreased;weight-shifting ability decreased  -LR sequencing ability decreased;weight-shifting ability decreased  -MJ     Stairs, Impairments ROM decreased;strength decreased;pain  -LR ROM decreased;strength decreased;pain  -MJ     Stairs, Comment Verbal cues to take one step at a time and to step up with strong LE first and down with weak LE first. Verbal cues for correct placement and sequencing of SPC. SPC on R, HHA on L. Verbal cues to back up to step with RW. Verbal cues to use RW for UE support. Verbal cues to step up backwards with strong LE first and down with weak LE first.   -LR Pt performed non-reciprocally. Verbal cues to ascend leading with R LE and to descend leading with L LE. Pt performed 1 step backward with RW and performed 2 steps with HHA and cane, requiring cues for sequencing with cane  -MJ     Recorded by [LR] Yenny Lujan, PT [MJ] Bonifacio Linares, PT     Functional Mobility    Functional Mobility- Device   rolling walker  -AC    Functional Mobility-Distance (Feet)   250  -AC    Recorded by   [AC] Sugar Flores, OT    Lower Body Dressing Assessment/Training    LB Dressing Assess/Train, Clothing Type   doffing:;donning:;slipper socks  -AC    LB Dressing Assess/Train, Assist Device   reacher;sock-aid  -AC    LB Dressing Assess/Train, Position   sitting  -AC    LB Dressing Assess/Train,  [place]   verbal cues required;minimum assist (75% patient effort)  -AC    Recorded by   [AC] Sugar Flores, OT    Therapy Exercises    Exercise Protocols total knee  -LR total knee  -MJ     Total Knee Exercises left:;15 reps;completed protocol;with assist;ankle pumps/circles;quad set;glut set;heel slide stretch;SLR;SAQ;LAQ   cues for technique;min assist with SLR, hip abd,SAQ,LAQ  -LR left:;15 reps;completed protocol;with assist;ankle pumps/circles;quad set;glut set;heel slide stretch;SLR;SAQ;hip abduction/adduction;LAQ   Cues for technique. Ady w/ SLR, hip abd  -MJ     Recorded by [LR] Yenny Lujan, PT [MJ] Bonifacio Linares, PT     Positioning and Restraints    Pre-Treatment Position in bed  -LR in bed  -MJ in bed  -AC    Post Treatment Position bathroom  -LR bed  -MJ chair  -AC    In Bed  notified nsg;supine;call light within reach;encouraged to call for assist;with family/caregiver  -MJ     In Chair   notified nsg;reclined;call light within reach;encouraged to call for assist  -AC    Bathroom notified nsg;sitting;call light within reach;encouraged to call for assist;with family/caregiver  -LR      Recorded by [LR] Yenny Lujan, PT [MJ] Bonifacio Linares, PT [AC] Sugar Flores, OT      09/08/17 1036          Rehab Assessment/Intervention    Discipline physical therapist  -      Document Type therapy note (daily note)  -MJ      Subjective Information agree to therapy;complains of;pain  -MJ      Patient Effort, Rehab Treatment good  -MJ      Symptoms Noted During/After Treatment none  -MJ      Precautions/Limitations fall precautions;other (see comments)   L adductor nerve catheter  -MJ      Recorded by [MJ] Bonifacio Linares, PT      Pain Assessment    Pain Assessment 0-10  -MJ      Pain Score 6  -MJ      Post Pain Score 6  -MJ      Pain Type Acute pain  -MJ      Pain Location Knee  -MJ      Pain Orientation Left;Anterior;Outer  -MJ      Pain Intervention(s) Repositioned;Ambulation/increased activity  -MJ       Recorded by [MJ] Bonifacio Linares, ANA      Cognitive Assessment/Intervention    Current Cognitive/Communication Assessment functional  -MJ      Orientation Status oriented x 4  -MJ      Follows Commands/Answers Questions 100% of the time;able to follow multi-step instructions;needs cueing  -MJ      Personal Safety mild impairment;impulsive  -MJ      Recorded by [MJ] Bonifacio Linares PT      Mobility Assessment/Training    Extremity Weight-Bearing Status left lower extremity  -MJ      Left Lower Extremity Weight-Bearing weight-bearing as tolerated  -MJ      Recorded by [MJ] Bonifacio Linares, ANA      Bed Mobility, Assessment/Treatment    Bed Mobility, Assistive Device bed rails;head of bed elevated;leg   -MJ      Bed Mob, Supine to Sit, [place] contact guard assist;verbal cues required  -MJ      Bed Mob, Sit to Supine, [place] not tested   Pt UIC  -MJ      Bed Mobility, Safety Issues decreased use of legs for bridging/pushing  -MJ      Bed Mobility, Impairments ROM decreased;strength decreased;pain  -MJ      Bed Mobility, Comment Verbal cues for sequencing with leg , increased time and effort to perform  -MJ      Recorded by [MJ] Bonifacio Linares PT      Transfer Assessment/Treatment    Transfers, Sit-Stand [place] contact guard assist;verbal cues required  -MJ      Transfers, Stand-Sit [place] contact guard assist;verbal cues required  -MJ      Transfers, Sit-Stand-Sit, Assist Device rolling walker  -MJ      Transfer, Safety Issues sequencing ability decreased;step length decreased;weight-shifting ability decreased  -MJ      Transfer, Impairments ROM decreased;strength decreased;pain  -MJ      Transfer, Comment Verbal cues for correct hand placement and to step L LE out prior to t/f. Pt stood before gait belt donned and RW in front of her, cues for safety  -MJ      Recorded by [MJ] Bonifacio Linares, ANA      Gait Assessment/Treatment    Gait, [place] Level contact guard assist;verbal cues required   -MJ      Gait, Assistive Device rolling walker  -      Gait, Distance (Feet) 250  -MJ      Gait, Gait Pattern Analysis swing-through gait  -      Gait, Gait Deviations left:;antalgic;kristen decreased;step length decreased;weight-shifting ability decreased  -      Gait, Safety Issues step length decreased;weight-shifting ability decreased  -MJ      Gait, Impairments ROM decreased;strength decreased;pain  -MJ      Gait, Comment Pt demo step through gait pattern at slow pace. Verbal cues to increase LE weight bearing, improvement noted. Pt exhibits good heel strike and sequencing with RW without cues. Gait limited by pain and fatigue  -      Recorded by [MJ] Bonifacio Linares PT      Therapy Exercises    Exercise Protocols total knee  -      Total Knee Exercises left:;15 reps;completed protocol;with assist;ankle pumps/circles;quad set;glut set;heel slide stretch;SLR;SAQ;hip abduction/adduction;LAQ   Cues for technique. Ady w/ SLR, hip abd, SAQ  -MJ      Recorded by [MJ] Bonifacio Linares PT      Positioning and Restraints    Pre-Treatment Position in bed  -MJ      Post Treatment Position chair  -MJ      In Chair notified nsg;reclined;call light within reach;encouraged to call for assist  -MJ      Recorded by [MJ] Bonifacio Linares PT        User Key  (r) = Recorded By, (t) = Taken By, (c) = Cosigned By    Initials Name Effective Dates    AC Sugar Flores, OT 06/23/15 -     LR Yenny Lujan, PT 06/19/15 -     MJ Bonifacio Linares, PT 03/14/16 -           PT Recommendation and Plan  Anticipated Equipment Needs At Discharge: front wheeled walker  Anticipated Discharge Disposition: home with assist, home with home health  Planned Therapy Interventions: balance training, bed mobility training, gait training, home exercise program, patient/family education, ROM (Range of Motion), stair training, strengthening, transfer training  PT Frequency: 2 times/day  Plan of Care Review  Plan Of Care Reviewed With: patient,  spouse  Progress: progress toward functional goals as expected  Outcome Summary/Follow up Plan: Increased gait distance to 250 feet with step through gait pattern. Flexion ROM very limited by pain/patient resisting, only able to flex 47 degrees with overpressure. Extension ROM progressing well, only lacking 5 degrees of extension AROM. Completed stair training with no difficulty. Plan is d/c home with HHPT.           Outcome Measures       09/09/17 0831 09/08/17 1434 09/08/17 1042    How much help from another person do you currently need...    Turning from your back to your side while in flat bed without using bedrails? 4  -LR 3  -MJ     Moving from lying on back to sitting on the side of a flat bed without bedrails? 4  -LR 3  -MJ     Moving to and from a bed to a chair (including a wheelchair)? 3  -LR 3  -MJ     Standing up from a chair using your arms (e.g., wheelchair, bedside chair)? 4  -LR 3  -MJ     Climbing 3-5 steps with a railing? 3  -LR 3  -MJ     To walk in hospital room? 3  -LR 3  -MJ     AM-PAC 6 Clicks Score 21  -LR 18  -MJ     How much help from another is currently needed...    Putting on and taking off regular lower body clothing?   2  -AC    Bathing (including washing, rinsing, and drying)   3  -AC    Toileting (which includes using toilet bed pan or urinal)   3  -AC    Putting on and taking off regular upper body clothing   4  -AC    Taking care of personal grooming (such as brushing teeth)   4  -AC    Eating meals   4  -AC    Score   20  -AC    Functional Assessment    Outcome Measure Options AM-PAC 6 Clicks Basic Mobility (PT)  -LR AM-PAC 6 Clicks Basic Mobility (PT)  -MJ AM-PAC 6 Clicks Daily Activity (OT)  -AC      09/08/17 1036 09/07/17 1535       How much help from another person do you currently need...    Turning from your back to your side while in flat bed without using bedrails? 3  -MJ 3  -LR     Moving from lying on back to sitting on the side of a flat bed without bedrails? 3  -MJ 3   -LR     Moving to and from a bed to a chair (including a wheelchair)? 3  -MJ 3  -LR     Standing up from a chair using your arms (e.g., wheelchair, bedside chair)? 3  -MJ 3  -LR     Climbing 3-5 steps with a railing? 3  -MJ 2  -LR     To walk in hospital room? 3  -MJ 3  -LR     AM-PAC 6 Clicks Score 18  -MJ 17  -LR     Functional Assessment    Outcome Measure Options AM-PAC 6 Clicks Basic Mobility (PT)  -MJ AM-PAC 6 Clicks Basic Mobility (PT)  -LR       User Key  (r) = Recorded By, (t) = Taken By, (c) = Cosigned By    Initials Name Provider Type    AC Sugar Flores, OT Occupational Therapist    LR Yenny Lujan, PT Physical Therapist    LANE Linares, ANA Physical Therapist           Time Calculation:         PT Charges       09/09/17 1016          Time Calculation    Start Time 0831  -LR      PT Received On 09/09/17  -LR      PT Goal Re-Cert Due Date 09/17/17  -LR      Time Calculation- PT    Total Timed Code Minutes- PT 25 minute(s)  -LR        User Key  (r) = Recorded By, (t) = Taken By, (c) = Cosigned By    Initials Name Provider Type    LR Yenny Lujan, PT Physical Therapist          Therapy Charges for Today     Code Description Service Date Service Provider Modifiers Qty    29155453407 HC GAIT TRAINING EA 15 MIN 9/9/2017 Yenny Lujan, PT GP 1    94787026010 HC PT THER PROC EA 15 MIN 9/9/2017 Yenny Lujan, PT GP 1          PT G-Codes  Outcome Measure Options: AM-PAC 6 Clicks Basic Mobility (PT)    PT Discharge Summary  Anticipated Discharge Disposition: home with assist, home with home health  Reason for Discharge: Discharge from facility  Outcomes Achieved: Patient able to partially acheive established goals  Discharge Destination: Home with assist, Home with home health    Yenny Lujan, PT  9/9/2017

## 2017-09-09 NOTE — PLAN OF CARE
Problem: Patient Care Overview (Adult)  Goal: Plan of Care Review  Outcome: Outcome(s) achieved Date Met:  09/09/17 09/09/17 1519   Coping/Psychosocial Response Interventions   Plan Of Care Reviewed With patient;spouse   Patient Care Overview   Progress improving   Outcome Evaluation   Outcome Summary/Follow up Plan Vitals WNL. Pt voiding spontaneously but only able to have a small BM prior to D/C. Pain well controlled with On-Q and lortab. Ambulating with stand-by assist and a walker. D/C'd home with HH. On-Q hooked up prior to D/C.          Problem: Perioperative Period (Adult)  Goal: Signs and Symptoms of Listed Potential Problems Will be Absent or Manageable (Perioperative Period)  Outcome: Ongoing (interventions implemented as appropriate)    09/09/17 1519   Perioperative Period   Problems Assessed (Perioperative Period) all   Problems Present (Perioperative Period) pain         Problem: Knee Replacement, Total (Adult)  Goal: Signs and Symptoms of Listed Potential Problems Will be Absent or Manageable (Knee Replacement, Total)  Outcome: Outcome(s) achieved Date Met:  09/09/17 09/09/17 1519   Knee Replacement, Total   Problems Assessed (Total Knee Replacement) all   Problems Present (Total Knee Replacement) pain;constipation;decreased range of motion;functional decline/self care deficit         Problem: Fall Risk (Adult)  Goal: Identify Related Risk Factors and Signs and Symptoms  Outcome: Outcome(s) achieved Date Met:  09/09/17 09/09/17 1519   Fall Risk   Fall Risk: Related Risk Factors gait/mobility problems;environment unfamiliar   Fall Risk: Signs and Symptoms presence of risk factors       Goal: Absence of Falls  Outcome: Outcome(s) achieved Date Met:  09/09/17 09/09/17 1519   Fall Risk (Adult)   Absence of Falls achieves outcome

## 2017-09-09 NOTE — PROGRESS NOTES
Continued Stay Note  [facility]     Patient Name: Qi Gupta  MRN: [id number]  Today's Date: 9/9/2017    Admit Date: 9/7/2017          Discharge Plan       09/09/17 1035    Case Management/Social Work Plan    Plan Home w/ HH    Patient/Family In Agreement With Plan yes    Additional Comments Notified Virgen davila/ [facility] of patient's pending dc today. CM will follow.               Discharge Codes     None        Expected Discharge Date and Time     Expected Discharge Date Expected Discharge Time    Sep 9, 2017             Cary Bryant RN

## 2017-09-09 NOTE — PLAN OF CARE
Problem: Patient Care Overview (Adult)  Goal: Plan of Care Review  Outcome: Ongoing (interventions implemented as appropriate)    09/09/17 0831   Coping/Psychosocial Response Interventions   Plan Of Care Reviewed With patient;spouse   Patient Care Overview   Progress progress toward functional goals as expected   Outcome Evaluation   Outcome Summary/Follow up Plan Increased gait distance to 250 feet with step through gait pattern. Flexion ROM very limited by pain/patient resisting, only able to flex 47 degrees with overpressure. Extension ROM progressing well, only lacking 5 degrees of extension AROM. Completed stair training with no difficulty. Plan is d/c home with HHPT.          Problem: Knee Replacement, Total (Adult)  Goal: Signs and Symptoms of Listed Potential Problems Will be Absent or Manageable (Knee Replacement, Total)  Outcome: Ongoing (interventions implemented as appropriate)    09/09/17 0831   Knee Replacement, Total   Problems Assessed (Total Knee Replacement) pain;decreased range of motion;functional decline/self care deficit   Problems Present (Total Knee Replacement) pain;decreased range of motion;functional decline/self care deficit         Problem: Inpatient Physical Therapy  Goal: Bed Mobility Goal LTG- PT  Outcome: Outcome(s) achieved Date Met:  09/09/17 09/09/17 0831   Bed Mobility PT LTG   Bed Mobility PT LTG, Date Established 09/07/17   Bed Mobility PT LTG, Time to Achieve 5 days   Bed Mobility PT LTG, Activity Type supine to sit/sit to supine   Bed Mobility PT LTG, [place] Level conditional independence   Bed Mobility PT LTG, Date Goal Reviewed 09/09/17   Bed Mobility PT LTG, Outcome goal met       Goal: Transfer Training Goal 1 LTG- PT  Outcome: Outcome(s) achieved Date Met:  09/09/17 09/09/17 0831   Transfer Training PT LTG   Transfer Training PT LTG, Date Established 09/07/17   Transfer Training PT LTG, Time to Achieve 5 days   Transfer Training PT LTG, Activity Type sit to  stand/stand to sit   Transfer Training PT LTG, [place] Level conditional independence   Transfer Training PT LTG, Assist Device walker, rolling   Transfer Training PT LTG, Date Goal Reviewed 09/09/17   Transfer Training PT LTG, Outcome goal met       Goal: Gait Training Goal LTG- PT  Outcome: Unable to achieve outcome(s) by discharge Date Met:  09/09/17 09/09/17 0831   Gait Training PT LTG   Gait Training Goal PT LTG, Date Established 09/07/17   Gait Training Goal PT LTG, Time to Achieve 5 days   Gait Training Goal PT LTG, [place] Level conditional independence   Gait Training Goal PT LTG, Assist Device walker, rolling   Gait Training Goal PT LTG, Distance to Achieve 500 feet   Gait Training Goal PT LTG, Date Goal Reviewed 09/09/17   Gait Training Goal PT LTG, Outcome goal not met   Gait Training Goal PT LTG, Reason Goal Not Met discharged from facility       Goal: Range of Motion Goal LTG- PT  Outcome: Unable to achieve outcome(s) by discharge Date Met:  09/09/17 09/09/17 0831   Range of Motion PT LTG   Range of Motion Goal PT LTG, Date Established 09/07/17   Range of Motion Goal PT LTG, Time to Achieve 5 days   Range fo Motion Goal PT LTG, Joint L knee   Range of Motion Goal PT LTG, AAROM Measure 0-90 degrees   Range of Motion Goal PT LTG, Date Goal Reviewed 09/09/17   Range of Motion Goal PT LTG, Outcome goal not met   Reason Goal Not Met (ROM) PT LTG discharged from facility

## 2017-09-09 NOTE — PLAN OF CARE
Problem: Patient Care Overview (Adult)  Goal: Plan of Care Review  Outcome: Ongoing (interventions implemented as appropriate)    09/09/17 0356   Coping/Psychosocial Response Interventions   Plan Of Care Reviewed With patient   Patient Care Overview   Progress progress toward functional goals as expected   Outcome Evaluation   Outcome Summary/Follow up Plan Pt VSS. Pt with minimal c/o pain. Prineo dressing intact. Rested through the night.         Problem: Perioperative Period (Adult)  Goal: Signs and Symptoms of Listed Potential Problems Will be Absent or Manageable (Perioperative Period)  Outcome: Ongoing (interventions implemented as appropriate)    Problem: Knee Replacement, Total (Adult)  Goal: Signs and Symptoms of Listed Potential Problems Will be Absent or Manageable (Knee Replacement, Total)  Outcome: Ongoing (interventions implemented as appropriate)    Problem: Fall Risk (Adult)  Goal: Identify Related Risk Factors and Signs and Symptoms  Outcome: Ongoing (interventions implemented as appropriate)  Goal: Absence of Falls  Outcome: Ongoing (interventions implemented as appropriate)

## 2017-09-10 NOTE — PROGRESS NOTES
[facility]    Nerve Cath Post Op Call    Patient Name: Qi Gupta  :  1950  MRN:  [id number]  Date of Discharge: 2017    Treatment Plan

## 2017-09-11 NOTE — PROGRESS NOTES
[facility]    Nerve Cath Post Op Call    Patient Name: Qi Gupta  :  1950  MRN:  [id number]  Date of Discharge: 2017    Nerve Cath Post Op Call:    Catheter Plan:Patient/Family member report nerve catheter previously discontinued, tip intact

## (undated) DEVICE — SENSR O2 OXIMAX FNGR A/ 18IN NONSTR

## (undated) DEVICE — SIGMA LCS HIGH PERFORMANCE STERILE THREADED HEADED PINS: Brand: SIGMA LCS HIGH PERFORMANCE

## (undated) DEVICE — CVR HNDL LT SURG ACCSSRY BLU STRL

## (undated) DEVICE — SIGMA LCS HIGH PERFORMANCE INSTRUMENTS STERILE THREADED PINS: Brand: SIGMA LCS HIGH PERFORMANCE

## (undated) DEVICE — NDL SPINE 18G 31/2IN PNK

## (undated) DEVICE — UNDERCAST PADDING: Brand: DEROYAL

## (undated) DEVICE — TRY EPID SFTY 18G 3.5IN 1T7680

## (undated) DEVICE — ANTIBACTERIAL UNDYED BRAIDED (POLYGLACTIN 910), SYNTHETIC ABSORBABLE SUTURE: Brand: COATED VICRYL

## (undated) DEVICE — RECIPROCATING BLADE, DOUBLE SIDED, OFFSET  (70.0 X 0.8 X 12.5MM)

## (undated) DEVICE — 3M™ TEGADERM™ CHG DRESSING 25/CARTON 4 CARTONS/CASE 1658: Brand: TEGADERM™

## (undated) DEVICE — ADAPT ST INFUS ADMIN SYR 70IN

## (undated) DEVICE — BNDG ELAS ELITE V/CLOSE 6IN 5YD LF STRL

## (undated) DEVICE — NERVE BLOCK SUPPORT KIT/BLUE: Brand: MEDLINE INDUSTRIES, INC.

## (undated) DEVICE — KT PUMP PAIN ONQ CBLOC SELCTAFLO 400ML

## (undated) DEVICE — SPNG GZ STRL 2S 4X4 12PLY

## (undated) DEVICE — ENCORE® LATEX MICRO SIZE 8, STERILE LATEX POWDER-FREE SURGICAL GLOVE: Brand: ENCORE

## (undated) DEVICE — PK KN TOTL 10

## (undated) DEVICE — DRSNG PAD ABD 8X10IN STRL

## (undated) DEVICE — BNDG ELAS W/CLIP 6IN 10YD LF STRL

## (undated) DEVICE — STRYKER PERFORMANCE SERIES SAGITTAL BLADE: Brand: STRYKER PERFORMANCE SERIES

## (undated) DEVICE — SYS SKIN CLS DERMABOND PRINEO W/22CM MESH TP

## (undated) DEVICE — CANN NASL CO2 DIVIDED A/

## (undated) DEVICE — Device

## (undated) DEVICE — INTENDED USE FOR SURGICAL MARKING ON INTACT SKIN, ALSO PROVIDES A PERMANENT METHOD OF IDENTIFYING OBJECTS IN THE OPERATING ROOM: Brand: WRITESITE® REGULAR TIP SKIN MARKER

## (undated) DEVICE — ST NERV BLCK CONT CONTIPLEX ECHO CLSD 18G 4IN